# Patient Record
Sex: FEMALE | Race: WHITE | NOT HISPANIC OR LATINO | Employment: OTHER | URBAN - METROPOLITAN AREA
[De-identification: names, ages, dates, MRNs, and addresses within clinical notes are randomized per-mention and may not be internally consistent; named-entity substitution may affect disease eponyms.]

---

## 2017-01-23 ENCOUNTER — OFFICE VISIT (OUTPATIENT)
Dept: GASTROENTEROLOGY | Facility: CLINIC | Age: 57
End: 2017-01-23
Payer: COMMERCIAL

## 2017-01-23 VITALS
DIASTOLIC BLOOD PRESSURE: 80 MMHG | HEART RATE: 80 BPM | BODY MASS INDEX: 36.39 KG/M2 | WEIGHT: 226.44 LBS | HEIGHT: 66 IN | SYSTOLIC BLOOD PRESSURE: 128 MMHG

## 2017-01-23 DIAGNOSIS — R19.7 DIARRHEA IN ADULT PATIENT: Primary | ICD-10-CM

## 2017-01-23 PROCEDURE — 3079F DIAST BP 80-89 MM HG: CPT | Mod: S$GLB,,, | Performed by: INTERNAL MEDICINE

## 2017-01-23 PROCEDURE — 1159F MED LIST DOCD IN RCRD: CPT | Mod: S$GLB,,, | Performed by: INTERNAL MEDICINE

## 2017-01-23 PROCEDURE — 3074F SYST BP LT 130 MM HG: CPT | Mod: S$GLB,,, | Performed by: INTERNAL MEDICINE

## 2017-01-23 PROCEDURE — 99999 PR PBB SHADOW E&M-EST. PATIENT-LVL III: CPT | Mod: PBBFAC,,, | Performed by: INTERNAL MEDICINE

## 2017-01-23 PROCEDURE — 99203 OFFICE O/P NEW LOW 30 MIN: CPT | Mod: S$GLB,,, | Performed by: INTERNAL MEDICINE

## 2017-01-23 RX ORDER — METOPROLOL SUCCINATE 25 MG/1
TABLET, EXTENDED RELEASE ORAL
Refills: 0 | COMMUNITY
Start: 2017-01-04

## 2017-01-23 RX ORDER — LEVOTHYROXINE SODIUM 175 UG/1
175 TABLET ORAL DAILY
Refills: 1 | COMMUNITY
Start: 2016-11-18 | End: 2017-01-23

## 2017-01-23 RX ORDER — LEVOTHYROXINE SODIUM 150 UG/1
TABLET ORAL
Refills: 1 | COMMUNITY
Start: 2016-12-28 | End: 2023-07-17

## 2017-01-23 RX ORDER — ASPIRIN 81 MG/1
162 TABLET ORAL
COMMUNITY
End: 2020-05-28

## 2017-01-23 NOTE — PROGRESS NOTES
Subjective:       Patient ID: Karen Carpio is a 56 y.o. female.    Chief Complaint: Diarrhea    HPI Comments: The patient has a history of diarrhea workup in 2013, which was negative. Biopsies were negative for microscopic colitis. She was having some anxiety issues the last time her workup was done, and when it was negative it was felt to be anxiety which triggered her diarrhea. The most recent episode started some 6 months ago. There is nothing she can relate to the change in her symptoms. She has had no BRBPR or melena. There has been no true anorexia but says she is never truly hungry or thirsty. There is no un-intended weight loss. There is no abdominal pain; nausea or vomiting, and no aversion to the sight or smell of food.     The patient is on Metformin, but has been on this for years. Just the same I will ask her endocrinologist if we can do a trial of the patient off her metformin. Her stools are not always loose, but they are sometimes normal. Will also look to rule out overflow diarrhea.    Diarrhea    Pertinent negatives include no abdominal pain, arthralgias, chills, coughing, fever, headaches, myalgias or vomiting.     Review of Systems   Constitutional: Negative for activity change, appetite change, chills, diaphoresis, fatigue, fever and unexpected weight change.   HENT: Positive for ear pain and voice change. Negative for congestion, ear discharge, hearing loss, nosebleeds, postnasal drip and tinnitus.         Vertigo   Eyes: Negative for photophobia and visual disturbance.   Respiratory: Negative for apnea, cough, choking, chest tightness, shortness of breath and wheezing.    Cardiovascular: Negative for chest pain, palpitations and leg swelling.   Gastrointestinal: Positive for diarrhea. Negative for abdominal distention, abdominal pain, anal bleeding, blood in stool, constipation, nausea, rectal pain and vomiting.   Genitourinary: Negative for difficulty urinating, dyspareunia, dysuria, flank  pain, frequency, hematuria, menstrual problem, pelvic pain, urgency, vaginal bleeding and vaginal discharge.   Musculoskeletal: Negative for arthralgias, back pain, gait problem, joint swelling, myalgias and neck stiffness.   Skin: Negative for pallor and rash.   Neurological: Negative for dizziness, tremors, seizures, syncope, speech difficulty, weakness, numbness and headaches.   Hematological: Negative for adenopathy.   Psychiatric/Behavioral: Negative for agitation, confusion, hallucinations, sleep disturbance and suicidal ideas.       Objective:      Physical Exam   Constitutional: She is oriented to person, place, and time. She appears well-developed and well-nourished.   Obesity   HENT:   Head: Normocephalic and atraumatic.   Bilateral turbinate congestion   Eyes: Conjunctivae and EOM are normal. Pupils are equal, round, and reactive to light. Right eye exhibits no discharge. Left eye exhibits no discharge. No scleral icterus.   Neck: Normal range of motion. Neck supple. No JVD present. No thyromegaly present.   Cardiovascular: Normal rate, regular rhythm, normal heart sounds and intact distal pulses.  Exam reveals no gallop and no friction rub.    No murmur heard.  Pulmonary/Chest: Effort normal and breath sounds normal. No respiratory distress. She has no wheezes. She has no rales. She exhibits no tenderness.   Abdominal: Soft. Bowel sounds are normal. She exhibits no distension and no mass. There is no tenderness. There is no rebound and no guarding.   Musculoskeletal: Normal range of motion. She exhibits no edema.   Lymphadenopathy:     She has no cervical adenopathy.   Neurological: She is alert and oriented to person, place, and time. She has normal reflexes. She exhibits normal muscle tone. Coordination normal.   Skin: Skin is warm and dry. No rash noted. No erythema. No pallor.   Psychiatric: She has a normal mood and affect. Her behavior is normal. Judgment and thought content normal.   Vitals  reviewed.      Assessment:    Diarrhea   No diagnosis found.    Plan:       NATALI

## 2017-01-24 ENCOUNTER — HOSPITAL ENCOUNTER (OUTPATIENT)
Dept: RADIOLOGY | Facility: HOSPITAL | Age: 57
Discharge: HOME | End: 2017-01-24
Attending: INTERNAL MEDICINE
Payer: COMMERCIAL

## 2017-01-24 DIAGNOSIS — R19.7 DIARRHEA IN ADULT PATIENT: ICD-10-CM

## 2017-01-24 PROCEDURE — 74020 XR ABDOMEN FLAT AND ERECT: CPT | Mod: 26,,, | Performed by: RADIOLOGY

## 2017-01-24 PROCEDURE — 74020 XR ABDOMEN FLAT AND ERECT: CPT | Mod: TC

## 2017-01-27 DIAGNOSIS — K59.04 CHRONIC IDIOPATHIC CONSTIPATION: Primary | ICD-10-CM

## 2017-01-27 RX ORDER — SODIUM, POTASSIUM,MAG SULFATES 17.5-3.13G
SOLUTION, RECONSTITUTED, ORAL ORAL
Qty: 354 ML | Refills: 0 | Status: SHIPPED | OUTPATIENT
Start: 2017-01-27 | End: 2017-04-24

## 2017-04-10 ENCOUNTER — TELEPHONE (OUTPATIENT)
Dept: INTERNAL MEDICINE | Facility: CLINIC | Age: 57
End: 2017-04-10

## 2017-04-10 ENCOUNTER — PATIENT OUTREACH (OUTPATIENT)
Dept: ADMINISTRATIVE | Facility: HOSPITAL | Age: 57
End: 2017-04-10
Payer: COMMERCIAL

## 2017-04-10 DIAGNOSIS — E78.5 HYPERLIPIDEMIA, UNSPECIFIED HYPERLIPIDEMIA TYPE: ICD-10-CM

## 2017-04-10 DIAGNOSIS — Z11.59 NEED FOR HEPATITIS C SCREENING TEST: Primary | ICD-10-CM

## 2017-04-10 DIAGNOSIS — E88.819 INSULIN RESISTANCE: ICD-10-CM

## 2017-04-10 RX ORDER — ESCITALOPRAM OXALATE 10 MG/1
TABLET ORAL
Qty: 30 TABLET | Refills: 0 | Status: SHIPPED | OUTPATIENT
Start: 2017-04-10 | End: 2017-05-13 | Stop reason: SDUPTHER

## 2017-04-10 RX ORDER — BUPROPION HYDROCHLORIDE 300 MG/1
TABLET ORAL
Qty: 30 TABLET | Status: SHIPPED | OUTPATIENT
Start: 2017-04-10 | End: 2017-05-13 | Stop reason: SDUPTHER

## 2017-04-10 RX ORDER — FERROUS SULFATE 325(65) MG
TABLET ORAL
Qty: 30 TABLET | Refills: 0 | Status: SHIPPED | OUTPATIENT
Start: 2017-04-10 | End: 2017-05-13 | Stop reason: SDUPTHER

## 2017-04-10 NOTE — PROGRESS NOTES
PREVISIT CHART AUDIT LETTER SENT VIA PATIENT PORTAL  Hep C antibody, HgA1c and Lipid Panel ordered [facility]

## 2017-04-24 ENCOUNTER — LAB VISIT (OUTPATIENT)
Dept: LAB | Facility: HOSPITAL | Age: 57
End: 2017-04-24
Attending: PEDIATRICS
Payer: COMMERCIAL

## 2017-04-24 ENCOUNTER — OFFICE VISIT (OUTPATIENT)
Dept: INTERNAL MEDICINE | Facility: CLINIC | Age: 57
End: 2017-04-24
Payer: COMMERCIAL

## 2017-04-24 VITALS
TEMPERATURE: 97 F | WEIGHT: 223.13 LBS | OXYGEN SATURATION: 98 % | BODY MASS INDEX: 35.86 KG/M2 | DIASTOLIC BLOOD PRESSURE: 88 MMHG | HEIGHT: 66 IN | HEART RATE: 66 BPM | SYSTOLIC BLOOD PRESSURE: 116 MMHG

## 2017-04-24 DIAGNOSIS — E88.819 INSULIN RESISTANCE: ICD-10-CM

## 2017-04-24 DIAGNOSIS — F32.A DEPRESSION, UNSPECIFIED DEPRESSION TYPE: ICD-10-CM

## 2017-04-24 DIAGNOSIS — I48.0 PAROXYSMAL ATRIAL FIBRILLATION: ICD-10-CM

## 2017-04-24 DIAGNOSIS — E03.4 HYPOTHYROIDISM DUE TO ACQUIRED ATROPHY OF THYROID: ICD-10-CM

## 2017-04-24 DIAGNOSIS — Z00.00 WELL ADULT EXAM: ICD-10-CM

## 2017-04-24 DIAGNOSIS — K59.01 SLOW TRANSIT CONSTIPATION: ICD-10-CM

## 2017-04-24 DIAGNOSIS — E55.9 VITAMIN D DEFICIENCY: ICD-10-CM

## 2017-04-24 DIAGNOSIS — E78.5 HYPERLIPIDEMIA, UNSPECIFIED HYPERLIPIDEMIA TYPE: ICD-10-CM

## 2017-04-24 DIAGNOSIS — K59.04 CHRONIC IDIOPATHIC CONSTIPATION: ICD-10-CM

## 2017-04-24 DIAGNOSIS — D50.9 IRON DEFICIENCY ANEMIA, UNSPECIFIED IRON DEFICIENCY ANEMIA TYPE: ICD-10-CM

## 2017-04-24 DIAGNOSIS — Z11.59 NEED FOR HEPATITIS C SCREENING TEST: ICD-10-CM

## 2017-04-24 DIAGNOSIS — Z00.00 WELL ADULT EXAM: Primary | ICD-10-CM

## 2017-04-24 PROCEDURE — 82306 VITAMIN D 25 HYDROXY: CPT

## 2017-04-24 PROCEDURE — 36415 COLL VENOUS BLD VENIPUNCTURE: CPT | Mod: PO

## 2017-04-24 PROCEDURE — 86803 HEPATITIS C AB TEST: CPT

## 2017-04-24 PROCEDURE — 99396 PREV VISIT EST AGE 40-64: CPT | Mod: S$GLB,,, | Performed by: PEDIATRICS

## 2017-04-24 PROCEDURE — 83036 HEMOGLOBIN GLYCOSYLATED A1C: CPT

## 2017-04-24 PROCEDURE — 99999 PR PBB SHADOW E&M-EST. PATIENT-LVL III: CPT | Mod: PBBFAC,,, | Performed by: PEDIATRICS

## 2017-04-24 PROCEDURE — 80061 LIPID PANEL: CPT

## 2017-04-24 RX ORDER — SODIUM, POTASSIUM,MAG SULFATES 17.5-3.13G
SOLUTION, RECONSTITUTED, ORAL ORAL
Qty: 354 ML | Refills: 0 | Status: SHIPPED | OUTPATIENT
Start: 2017-04-24 | End: 2018-04-25

## 2017-04-24 NOTE — PROGRESS NOTES
Subjective:       Patient ID: Karen Carpio is a 57 y.o. female.    Chief Complaint: Follow-up    HPI Comments: Wellness exam.    GYN: outside GYN upcoming  Insulin resistance/hypothyroid/obesity :Followed by Dr Vidal FLYNN fib: followed by Dr Gresham  Chronic constipation with leakage: Has not been consistent with miralax  Depression: on wellbutrin/lexapro: has always chronic low level symptoms. Ho HI/SI. Has been traveling by self, is adapting to life as , is dating.   Hypercholesterol: High HDL  Anemia: stable.    Review of Systems   Constitutional: Negative for fever and unexpected weight change.   HENT: Negative for congestion and rhinorrhea.    Eyes: Negative for discharge and redness.   Respiratory: Negative for cough and wheezing.    Gastrointestinal: Positive for constipation and diarrhea. Negative for vomiting.   Endocrine: Negative for cold intolerance, heat intolerance, polydipsia, polyphagia and polyuria.   Genitourinary: Negative for decreased urine volume, difficulty urinating and menstrual problem.   Musculoskeletal: Negative for arthralgias and joint swelling.   Skin: Negative for rash and wound.   Neurological: Negative for syncope and headaches.   Psychiatric/Behavioral: Positive for dysphoric mood. Negative for behavioral problems, self-injury, sleep disturbance and suicidal ideas. The patient is not nervous/anxious.         Chronic low level.       Objective:      Physical Exam   Constitutional: She is oriented to person, place, and time. She appears well-developed and well-nourished. She is cooperative.   HENT:   Head: Normocephalic and atraumatic.   Eyes: Conjunctivae, EOM and lids are normal. Pupils are equal, round, and reactive to light.   Neck: Trachea normal and normal range of motion. Neck supple. No JVD present. Carotid bruit is not present. No Brudzinski's sign and no Kernig's sign noted. No thyroid mass and no thyromegaly present.   Cardiovascular: Normal rate, regular rhythm,  normal heart sounds and normal pulses.    No murmur heard.  Pulmonary/Chest: Effort normal and breath sounds normal. She has no wheezes. She has no rhonchi. She has no rales.   Abdominal: Soft. Normal appearance. There is no hepatosplenomegaly. There is no tenderness. There is no rebound and no CVA tenderness.   Musculoskeletal: She exhibits no edema.   Lymphadenopathy:     She has no cervical adenopathy.   Neurological: She is alert and oriented to person, place, and time. She has normal strength and normal reflexes. No cranial nerve deficit or sensory deficit. Coordination and gait normal.   Skin: Skin is warm. No abrasion and no rash noted.   Psychiatric: She has a normal mood and affect. Her speech is normal and behavior is normal. Judgment and thought content normal. Her mood appears not anxious. Cognition and memory are normal. She does not exhibit a depressed mood.       Assessment:       1. Well adult exam    2. Hypothyroidism due to acquired atrophy of thyroid    3. Insulin resistance    4. Iron deficiency anemia, unspecified iron deficiency anemia type    5. Hyperlipidemia, unspecified hyperlipidemia type    6. Depression, unspecified depression type    7. Vitamin D deficiency    8. Paroxysmal atrial fibrillation    9. Slow transit constipation        Plan:       Well adult exam    Hypothyroidism due to acquired atrophy of thyroid    Insulin resistance    Iron deficiency anemia, unspecified iron deficiency anemia type    Hyperlipidemia, unspecified hyperlipidemia type    Depression, unspecified depression type    Vitamin D deficiency    Paroxysmal atrial fibrillation    Slow transit constipation    Keep GYN and endo care. Daily regular miralax use discussed. Meds reviewed. 2000 IU D daily. Meds reviewed. HM issues discussed.

## 2017-04-24 NOTE — MR AVS SNAPSHOT
[facility]  [address] [place] [address]  Phone: [phone]  Fax: [phone]                  Karen Carpio   2017 2:00 PM   Office Visit    Description:  Female : 1960   Provider:  YUDITH Navarro Jr., MD   Department:  [facility]           Reason for Visit     Follow-up           Diagnoses this Visit        Comments    Well adult exam    -  Primary     Hypothyroidism due to acquired atrophy of thyroid         Insulin resistance         Iron deficiency anemia, unspecified iron deficiency anemia type         Hyperlipidemia, unspecified hyperlipidemia type         Depression, unspecified depression type         Vitamin D deficiency         Paroxysmal atrial fibrillation         Slow transit constipation                To Do List           Future Appointments        Provider Department Dept Phone    2017 4:30 PM LAB, SAME DAY [facility] [phone]    2018 8:00 AM YUDITH Navarro Jr., MD [facility] [phone]      Goals (5 Years of Data)     None      Follow-Up and Disposition     Return in about 1 year (around 2018).    Follow-up and Disposition History      [facility]     [facility] Care [facility] -  Assistance  Unless otherwise directed by your provider, please contact [facility], our nurse care line that is available for  assistance.     Registered nurses in the [facility] provide: appointment scheduling, clinical advisement, health education, and other advisory services.  Call: [phone] (toll free)               Medications           Message regarding Medications     Verify the changes and/or additions to your medication regime listed below are the same as discussed with your clinician today.  If any of these changes or additions are incorrect, please notify your healthcare provider.        STOP taking these medications     alprazolam (XANAX) 0.5 MG  "tablet Take 1 tablet (0.5 mg total) by mouth 3 (three) times daily as needed for Anxiety.    cholecalciferol, vitamin D3, (VITAMIN D3) 2,000 unit Tab Take by mouth once daily.    mupirocin (BACTROBAN) 2 % ointment Apply topically 3 (three) times daily.    scopolamine (TRANSDERM-SCOP) 1.5 mg (1 mg over 3 days) Place 1 patch (1.5 mg total) onto the skin every 72 hours. PRN motion sickness    sodium,potassium,mag sulfates (SUPREP BOWEL PREP KIT) 17.5-3.13-1.6 gram SolR As Directed    trazodone (DESYREL) 50 MG tablet take 1 tablet by mouth EVERY NIGHT if needed for insomnia           Verify that the below list of medications is an accurate representation of the medications you are currently taking.  If none reported, the list may be blank. If incorrect, please contact your healthcare provider. Carry this list with you in case of emergency.           Current Medications     aspirin (ECOTRIN) 81 MG EC tablet Take 81 mg by mouth.    buPROPion (WELLBUTRIN XL) 300 MG 24 hr tablet take 1 tablet by mouth once daily    escitalopram oxalate (LEXAPRO) 10 MG tablet take 1 tablet by mouth once daily    esomeprazole (NEXIUM) 20 MG capsule Take 20 mg by mouth once daily.     ferrous sulfate 325 mg (65 mg iron) Tab tablet take 1 tablet by mouth once daily    fluticasone (FLONASE) 50 mcg/actuation nasal spray 1 spray by Each Nare route 2 (two) times daily.    levothyroxine (SYNTHROID) 150 MCG tablet     metformin (GLUCOPHAGE) 500 MG tablet Take by mouth. 2 Tablet Oral Twice a day    metoprolol succinate (TOPROL-XL) 25 MG 24 hr tablet     OM-3/DHA/EPA/FISH OIL/VIT D3 (FISH OIL-VIT D3 ORAL) Take by mouth.           Clinical Reference Information           Your Vitals Were     BP Pulse Temp Height    116/88 (BP Location: Left arm, Patient Position: Sitting, BP Method: Manual) 66 96.9 °F (36.1 °C) (Tympanic) 5' 6" (1.676 m)    Weight SpO2 BMI    101.2 kg (223 lb 1.7 oz) 98% 36.01 kg/m2      Blood Pressure          Most Recent Value    BP  " 116/88      Allergies as of 4/24/2017     Benadryl [Diphenhydramine Hcl]    Simcor [Niacin-simvastatin]      Immunizations Administered on Date of Encounter - 4/24/2017     None      Orders Placed During Today's Visit     Future Labs/Procedures Expected by Expires    Hepatitis C antibody  4/24/2017 6/23/2018    Vitamin D  4/24/2017 6/23/2018      Language Assistance Services     ATTENTION: Language assistance services are available, free of charge. Please call [phone].      ATENCIÓN: Si habla juliokatya, tiene a ricks disposición servicios gratuitos de asistencia lingüística. Llame al [phone].     CHÚ Ý: N?u b?n nói Ti?ng Vi?t, có các d?ch v? h? tr? ngôn ng? mi?n phí dành cho b?n. G?i s? [phone].         [facility] complies with applicable Federal civil rights laws and does not discriminate on the basis of race, color, national origin, age, disability, or sex.

## 2017-04-25 LAB
25(OH)D3+25(OH)D2 SERPL-MCNC: 34 NG/ML
CHOLEST/HDLC SERPL: 3.3 {RATIO}
ESTIMATED AVG GLUCOSE: 108 MG/DL
HBA1C MFR BLD HPLC: 5.4 %
HDL/CHOLESTEROL RATIO: 30.7 %
HDLC SERPL-MCNC: 244 MG/DL
HDLC SERPL-MCNC: 75 MG/DL
LDLC SERPL CALC-MCNC: 152.6 MG/DL
NONHDLC SERPL-MCNC: 169 MG/DL
TRIGL SERPL-MCNC: 82 MG/DL

## 2017-04-26 LAB
HCV AB SERPL QL IA: NEGATIVE
HCV AB SERPL QL IA: NEGATIVE

## 2017-05-14 RX ORDER — ESCITALOPRAM OXALATE 10 MG/1
TABLET ORAL
Qty: 30 TABLET | Refills: 11 | Status: SHIPPED | OUTPATIENT
Start: 2017-05-14 | End: 2020-05-28

## 2017-05-14 RX ORDER — FERROUS SULFATE 325(65) MG
TABLET ORAL
Qty: 30 TABLET | Refills: 11 | Status: SHIPPED | OUTPATIENT
Start: 2017-05-14 | End: 2018-05-09 | Stop reason: SDUPTHER

## 2017-05-14 RX ORDER — BUPROPION HYDROCHLORIDE 300 MG/1
TABLET ORAL
Qty: 30 TABLET | Refills: 11 | Status: SHIPPED | OUTPATIENT
Start: 2017-05-14 | End: 2020-05-28

## 2018-04-25 ENCOUNTER — OFFICE VISIT (OUTPATIENT)
Dept: INTERNAL MEDICINE | Facility: CLINIC | Age: 58
End: 2018-04-25
Payer: COMMERCIAL

## 2018-04-25 VITALS
BODY MASS INDEX: 38.44 KG/M2 | OXYGEN SATURATION: 96 % | HEIGHT: 66 IN | WEIGHT: 239.19 LBS | SYSTOLIC BLOOD PRESSURE: 130 MMHG | DIASTOLIC BLOOD PRESSURE: 80 MMHG | TEMPERATURE: 98 F | HEART RATE: 66 BPM

## 2018-04-25 DIAGNOSIS — Z00.00 WELL ADULT EXAM: Primary | ICD-10-CM

## 2018-04-25 DIAGNOSIS — E78.5 HYPERLIPIDEMIA, UNSPECIFIED HYPERLIPIDEMIA TYPE: ICD-10-CM

## 2018-04-25 DIAGNOSIS — E55.9 VITAMIN D DEFICIENCY: ICD-10-CM

## 2018-04-25 DIAGNOSIS — E03.4 HYPOTHYROIDISM DUE TO ACQUIRED ATROPHY OF THYROID: ICD-10-CM

## 2018-04-25 DIAGNOSIS — K59.01 SLOW TRANSIT CONSTIPATION: ICD-10-CM

## 2018-04-25 DIAGNOSIS — D50.9 IRON DEFICIENCY ANEMIA, UNSPECIFIED IRON DEFICIENCY ANEMIA TYPE: ICD-10-CM

## 2018-04-25 DIAGNOSIS — F32.A DEPRESSION, UNSPECIFIED DEPRESSION TYPE: ICD-10-CM

## 2018-04-25 DIAGNOSIS — E88.819 INSULIN RESISTANCE: ICD-10-CM

## 2018-04-25 DIAGNOSIS — I48.0 PAROXYSMAL ATRIAL FIBRILLATION: ICD-10-CM

## 2018-04-25 PROCEDURE — 99999 PR PBB SHADOW E&M-EST. PATIENT-LVL III: CPT | Mod: PBBFAC,,, | Performed by: PEDIATRICS

## 2018-04-25 PROCEDURE — 99396 PREV VISIT EST AGE 40-64: CPT | Mod: S$GLB,,, | Performed by: PEDIATRICS

## 2018-04-25 RX ORDER — CETIRIZINE HYDROCHLORIDE 10 MG/1
10 TABLET ORAL DAILY
COMMUNITY

## 2018-04-25 RX ORDER — CHOLECALCIFEROL (VITAMIN D3) 25 MCG
2000 TABLET ORAL DAILY
COMMUNITY

## 2018-04-25 RX ORDER — METFORMIN HYDROCHLORIDE 500 MG/1
200 TABLET, EXTENDED RELEASE ORAL
COMMUNITY
End: 2020-05-28

## 2018-05-09 RX ORDER — FERROUS SULFATE 325(65) MG
TABLET ORAL
Qty: 30 TABLET | Refills: 11 | Status: SHIPPED | OUTPATIENT
Start: 2018-05-09 | End: 2019-05-11 | Stop reason: SDUPTHER

## 2018-07-10 ENCOUNTER — OFFICE VISIT (OUTPATIENT)
Dept: OPHTHALMOLOGY | Facility: CLINIC | Age: 58
End: 2018-07-10

## 2018-07-10 DIAGNOSIS — H52.203 MYOPIC ASTIGMATISM OF BOTH EYES: ICD-10-CM

## 2018-07-10 DIAGNOSIS — Z01.00 VISIT FOR EYE AND VISION EXAM: Primary | ICD-10-CM

## 2018-07-10 DIAGNOSIS — Z13.5 GLAUCOMA SCREENING: ICD-10-CM

## 2018-07-10 DIAGNOSIS — H52.4 PRESBYOPIA: ICD-10-CM

## 2018-07-10 DIAGNOSIS — H52.13 MYOPIC ASTIGMATISM OF BOTH EYES: ICD-10-CM

## 2018-07-10 PROCEDURE — 92014 COMPRE OPH EXAM EST PT 1/>: CPT | Mod: S$GLB,,, | Performed by: OPTOMETRIST

## 2018-07-10 PROCEDURE — 92015 DETERMINE REFRACTIVE STATE: CPT | Mod: S$GLB,,, | Performed by: OPTOMETRIST

## 2018-07-10 NOTE — PROGRESS NOTES
HPI     Last [facility] exam 09/30/2015  Screening for glaucoma  RE  Decreased near vision mostly OS     Last edited by Chente Lopez MA on 7/10/2018 10:56 AM. (History)            Assessment /Plan     For exam results, see Encounter Report.    Visit for eye and vision exam    Glaucoma screening    Myopic astigmatism of both eyes    Presbyopia      OH OK OU.  Spec Rx given.  RTC one year or prn.

## 2019-01-28 ENCOUNTER — TELEPHONE (OUTPATIENT)
Dept: OPHTHALMOLOGY | Facility: CLINIC | Age: 59
End: 2019-01-28

## 2019-04-18 ENCOUNTER — TELEPHONE (OUTPATIENT)
Dept: INTERNAL MEDICINE | Facility: CLINIC | Age: 59
End: 2019-04-18

## 2019-04-18 NOTE — TELEPHONE ENCOUNTER
Called pt to r/s 04/25/19 appt w/ Dr. Navarro d/t provider out of clinic that afternoon, no answer, left pt voicemail requesting call back to r/s appt. Sent pt [place] message as well.

## 2019-05-07 ENCOUNTER — OFFICE VISIT (OUTPATIENT)
Dept: INTERNAL MEDICINE | Facility: CLINIC | Age: 59
End: 2019-05-07
Payer: COMMERCIAL

## 2019-05-07 VITALS
BODY MASS INDEX: 37.67 KG/M2 | HEART RATE: 68 BPM | OXYGEN SATURATION: 99 % | DIASTOLIC BLOOD PRESSURE: 88 MMHG | SYSTOLIC BLOOD PRESSURE: 118 MMHG | HEIGHT: 66 IN | WEIGHT: 234.38 LBS | TEMPERATURE: 97 F

## 2019-05-07 DIAGNOSIS — E78.5 HYPERLIPIDEMIA, UNSPECIFIED HYPERLIPIDEMIA TYPE: ICD-10-CM

## 2019-05-07 DIAGNOSIS — E55.9 VITAMIN D DEFICIENCY: ICD-10-CM

## 2019-05-07 DIAGNOSIS — D50.9 IRON DEFICIENCY ANEMIA, UNSPECIFIED IRON DEFICIENCY ANEMIA TYPE: ICD-10-CM

## 2019-05-07 DIAGNOSIS — F33.1 MODERATE EPISODE OF RECURRENT MAJOR DEPRESSIVE DISORDER: ICD-10-CM

## 2019-05-07 DIAGNOSIS — Z00.00 WELL ADULT EXAM: Primary | ICD-10-CM

## 2019-05-07 DIAGNOSIS — I48.0 PAROXYSMAL ATRIAL FIBRILLATION: ICD-10-CM

## 2019-05-07 DIAGNOSIS — K59.01 SLOW TRANSIT CONSTIPATION: ICD-10-CM

## 2019-05-07 DIAGNOSIS — E88.819 INSULIN RESISTANCE: ICD-10-CM

## 2019-05-07 DIAGNOSIS — E03.4 HYPOTHYROIDISM DUE TO ACQUIRED ATROPHY OF THYROID: ICD-10-CM

## 2019-05-07 PROCEDURE — 99999 PR PBB SHADOW E&M-EST. PATIENT-LVL III: ICD-10-PCS | Mod: PBBFAC,,, | Performed by: PEDIATRICS

## 2019-05-07 PROCEDURE — 99396 PR PREVENTIVE VISIT,EST,40-64: ICD-10-PCS | Mod: S$GLB,,, | Performed by: PEDIATRICS

## 2019-05-07 PROCEDURE — 99999 PR PBB SHADOW E&M-EST. PATIENT-LVL III: CPT | Mod: PBBFAC,,, | Performed by: PEDIATRICS

## 2019-05-07 PROCEDURE — 99396 PREV VISIT EST AGE 40-64: CPT | Mod: S$GLB,,, | Performed by: PEDIATRICS

## 2019-05-07 RX ORDER — ARIPIPRAZOLE 2 MG/1
2.5 TABLET ORAL DAILY
COMMUNITY
End: 2022-07-11

## 2019-05-07 RX ORDER — ATORVASTATIN CALCIUM 20 MG/1
TABLET, FILM COATED ORAL
COMMUNITY
Start: 2018-02-02

## 2019-05-07 NOTE — PROGRESS NOTES
Subjective:       Patient ID: Karen Carpio is a 58 y.o. female.     Chief Complaint: Follow-up     HPI Comments: Wellness exam.     GYN: outside GYN upcoming by Dr Lopez June 22, 2018 with mammogram  Insulin resistance/hypothyroid/obesity :Followed by Dr Drake [facility] everywhere note reviewed.  P A fib: followed by Dr Gresham, he is happy with lipids, does well with Afib, has occasional brief runs.  Chronic constipation/diarrhea resolved with switch to XR metformin  Depression: Dr gopal ribeiro, abilify just started, no HI/SI.   Hypercholesterol: High HDL  Anemia: stable.     Review of Systems   Constitutional: Negative for fever and unexpected weight change.   HENT: Negative for congestion and rhinorrhea.    Eyes: Negative for discharge and redness.   Respiratory: Negative for cough and wheezing.    Gastrointestinal: Negative for further constipation or diarrhea with adjustment to XR metformin. Negative for vomiting.   Endocrine: Negative for cold intolerance, heat intolerance, polydipsia, polyphagia and polyuria.   Genitourinary: Negative for decreased urine volume, difficulty urinating and menstrual problem.   Musculoskeletal: Negative for arthralgias and joint swelling.   Skin: Negative for rash and wound.   Neurological: Negative for syncope and headaches.   Psychiatric/Behavioral: Positive for dysphoric mood. Negative for behavioral problems, self-injury, sleep disturbance and suicidal ideas. The patient is not nervous/anxious.         Chronic low level.      Objective:   Physical Exam   Constitutional: She is oriented to person, place, and time. She appears well-developed and well-nourished. She is cooperative.   HENT:   Head: Normocephalic and atraumatic.   Eyes: Conjunctivae, EOM and lids are normal. Pupils are equal, round, and reactive to light.   Neck: Trachea normal and normal range of motion. Neck supple. No JVD present. Carotid bruit is not present. No Brudzinski's sign and no Kernig's sign noted.  No thyroid mass and no thyromegaly present.   Cardiovascular: Normal rate, regular rhythm, normal heart sounds and normal pulses.    No murmur heard.  Pulmonary/Chest: Effort normal and breath sounds normal. She has no wheezes. She has no rhonchi. She has no rales.   Abdominal: Soft. Normal appearance. There is no hepatosplenomegaly. There is no tenderness. There is no rebound and no CVA tenderness.   Musculoskeletal: She exhibits no edema.   Lymphadenopathy:     She has no cervical adenopathy.   Neurological: She is alert and oriented to person, place, and time. She has normal strength and normal reflexes. No cranial nerve deficit or sensory deficit. Coordination and gait normal.   Skin: Skin is warm. No abrasion and no rash noted.   Psychiatric: She has a normal mood and affect. Her speech is normal and behavior is normal. Judgment and thought content normal. Her mood appears not anxious. Cognition and memory are normal. She does not exhibit a depressed mood.      Assessment:      1. Well adult exam    2. Hypothyroidism due to acquired atrophy of thyroid    3. Insulin resistance    4. Iron deficiency anemia, unspecified iron deficiency anemia type    5. Hyperlipidemia, unspecified hyperlipidemia type    6. Depression, unspecified depression type    7. Vitamin D deficiency    8. Paroxysmal atrial fibrillation             Plan:    Keep GYN, cards, psychiatry, and endo care. Daily regular miralax use discussed. Meds reviewed. Continue D 2000 IU D daily. Meds reviewed. HM issues discussed. Shingrix when available.

## 2019-05-13 RX ORDER — FERROUS SULFATE 325(65) MG
TABLET ORAL
Qty: 30 TABLET | Refills: 11 | Status: SHIPPED | OUTPATIENT
Start: 2019-05-13 | End: 2020-05-27

## 2019-07-02 ENCOUNTER — OFFICE VISIT (OUTPATIENT)
Dept: INTERNAL MEDICINE | Facility: CLINIC | Age: 59
End: 2019-07-02
Payer: COMMERCIAL

## 2019-07-02 ENCOUNTER — TELEPHONE (OUTPATIENT)
Dept: INTERNAL MEDICINE | Facility: CLINIC | Age: 59
End: 2019-07-02

## 2019-07-02 VITALS
HEART RATE: 59 BPM | BODY MASS INDEX: 36.88 KG/M2 | OXYGEN SATURATION: 95 % | TEMPERATURE: 98 F | SYSTOLIC BLOOD PRESSURE: 134 MMHG | HEIGHT: 66 IN | DIASTOLIC BLOOD PRESSURE: 80 MMHG | WEIGHT: 229.5 LBS

## 2019-07-02 DIAGNOSIS — R05.9 COUGH: ICD-10-CM

## 2019-07-02 DIAGNOSIS — J01.90 ACUTE SINUSITIS, RECURRENCE NOT SPECIFIED, UNSPECIFIED LOCATION: Primary | ICD-10-CM

## 2019-07-02 PROCEDURE — 3008F BODY MASS INDEX DOCD: CPT | Mod: CPTII,S$GLB,, | Performed by: NURSE PRACTITIONER

## 2019-07-02 PROCEDURE — 3008F PR BODY MASS INDEX (BMI) DOCUMENTED: ICD-10-PCS | Mod: CPTII,S$GLB,, | Performed by: NURSE PRACTITIONER

## 2019-07-02 PROCEDURE — 99999 PR PBB SHADOW E&M-EST. PATIENT-LVL V: CPT | Mod: PBBFAC,,, | Performed by: NURSE PRACTITIONER

## 2019-07-02 PROCEDURE — 99214 PR OFFICE/OUTPT VISIT, EST, LEVL IV, 30-39 MIN: ICD-10-PCS | Mod: S$GLB,,, | Performed by: NURSE PRACTITIONER

## 2019-07-02 PROCEDURE — 99999 PR PBB SHADOW E&M-EST. PATIENT-LVL V: ICD-10-PCS | Mod: PBBFAC,,, | Performed by: NURSE PRACTITIONER

## 2019-07-02 PROCEDURE — 99214 OFFICE O/P EST MOD 30 MIN: CPT | Mod: S$GLB,,, | Performed by: NURSE PRACTITIONER

## 2019-07-02 RX ORDER — PREDNISONE 20 MG/1
TABLET ORAL
Refills: 0 | COMMUNITY
Start: 2019-06-28 | End: 2019-07-02 | Stop reason: ALTCHOICE

## 2019-07-02 RX ORDER — DOXYCYCLINE 100 MG/1
100 CAPSULE ORAL EVERY 12 HOURS
Qty: 20 CAPSULE | Refills: 0 | Status: SHIPPED | OUTPATIENT
Start: 2019-07-02 | End: 2019-07-12

## 2019-07-02 RX ORDER — BENZONATATE 100 MG/1
CAPSULE ORAL
Refills: 0 | COMMUNITY
Start: 2019-06-28 | End: 2020-05-28

## 2019-07-02 NOTE — TELEPHONE ENCOUNTER
----- Message from Sheryl Mckeon sent at 7/2/2019 10:11 AM CDT -----  Contact: pt  .Type:  Needs Medical Advice    Who Called:  pt  Symptoms (please be specific):  Mucus grayish    How long has patient had these symptoms:   1week  Pharmacy name and phone #:      [id number] [place] [facility] [id number]  [address] [place] [address]  Phone: [phone] Fax: [phone]      Would the patient rather a call back or a response via [facility]? Call back   Best Call Back Number:  [phone] (home)   Additional Information:  Pt went to [facility] after hours and an antibiotic was not given

## 2019-07-02 NOTE — TELEPHONE ENCOUNTER
Returned call to pt regarding having grayish mucus. Advised pt that she will need to be seen. Appt scheduled for 11:40 today with Juany Olivia. Pt verbalized understanding of appt date and time.

## 2019-07-02 NOTE — PROGRESS NOTES
CHIEF COMPLAINT/REASON FOR VISIT: nasal congestion, post nasal drip, sore throat, facial pressure    HISTORY OF PRESENT ILLNESS:    Karen Carpio.  59 y.o.  female complains of a sinus issue with nasal congestion, post nasal drip, sore throat, facial pressure, bilateral ear discomfort  and productive cough onset 1 week ago. Patient admits tried OTC medications with little relief.     PAST MEDICAL HISTORY:   Past Medical History:   Diagnosis Date    Acid reflux     Anemia, iron deficiency     Atrial fibrillation     Depression     GERD (gastroesophageal reflux disease)     Hyperlipidemia     Insulin resistance     Dr Drake    Insulin resistance     Pneumonia     Sleep apnea, obstructive     Dr León    Thyroid disease     Dr Drake         PAST SURGICAL HISTORY:.  Past Surgical History:   Procedure Laterality Date    COLONOSCOPY      COLONOSCOPY N/A 12/11/2013    [facility]    EGD (ESOPHAGOGASTRODUODENOSCOPY) N/A 12/11/2013    Per[facility] by [facility] at [facility] END[facility]    MANDIBLE SURGERY      TONSILLECTOMY, ADENOIDECTOMY           SOCIAL HISTORY:.  Social History     Socioeconomic History    Marital status:      Spouse name: Not on file    Number of children: Not on file    Years of education: Not on file    Highest education level: Not on file   Occupational History    Not on file   Social Needs    Financial resource strain: Not on file    Food insecurity:     Worry: Not on file     Inability: Not on file    Transportation needs:     Medical: Not on file     Non-medical: Not on file   Tobacco Use    Smoking status: Never Smoker    Smokeless tobacco: Never Used   Substance and Sexual Activity    Alcohol use: Yes     Alcohol/week: 1.1 oz     Types: 1 Glasses of wine, 1 Standard drinks or equivalent per week    Drug use: No    Sexual activity: Not Currently     Partners: Male     Birth control/protection: None   Lifestyle     "Physical activity:     Days per week: Not on file     Minutes per session: Not on file    Stress: Not on file   Relationships    Social connections:     Talks on phone: Not on file     Gets together: Not on file     Attends Faith service: Not on file     Active member of club or organization: Not on file     Attends meetings of clubs or organizations: Not on file     Relationship status: Not on file   Other Topics Concern    Not on file   Social History Narrative    Not on file       ROS:  GENERAL: Reports no fever, chills.  SKIN: No rashes, itching or changes in color or texture of skin.  HEENT: Reports sinus pressure, nasal congestion, postnasal drip, sore throat, ear discomfort, rhinorrhea.  CHEST: Reports cough and sputum production.  Denies shortness of breath and wheezing.  CARDIOVASCULAR: Denies chest pain, PND, orthopnea or reduced exercise tolerance.  ABDOMEN: Appetite fine. No weight loss. .  MUSCULOSKELETAL: No joint stiffness, pain or swelling. Denies back pain.  NEUROLOGIC: Report headaches. No history of seizures, paralysis, alteration of gait or coordination.  PSYCHIATRIC: Denies mood swings, depression or suicidal thoughts.    /80 (BP Location: Right arm, Patient Position: Sitting, BP Method: Large (Manual))   Pulse (!) 59   Temp 98.1 °F (36.7 °C) (Tympanic)   Ht 5' 6" (1.676 m)   Wt 104.1 kg (229 lb 8 oz)   SpO2 95%   BMI 37.04 kg/m² .    PE:   APPEARANCE: Well nourished, well developed, in mild distress.   SKIN: Normal skin turgor, no lesions.  HEENT: Turbinates red and enlarge, sinus tenderness on palpation, erythematous pharynx, TMs poor light reflex bilaterally.  CHEST: Lungs clear to auscultation. no wheezing  CARDIOVASCULAR: Regular rate and rhythm.      PLAN:   Advise Hydrate and rest.    Practice good handwashing.  Mucinex for cough and chest congestion.  Tylenol or Ibuprofen for fever, headache and body aches..  See PCP or go to ER if symptoms worsen or fail to improve " with treatment.        DIAGNOSIS:  Acute sinusitis, recurrence not specified, unspecified location  Comments:  Continue Ala-hist.    Orders:  -     doxycycline (VIBRAMYCIN) 100 MG Cap; Take 1 capsule (100 mg total) by mouth every 12 (twelve) hours. for 10 days  Dispense: 20 capsule; Refill: 0    Cough  Comments:  Continue Tessalon        Instructed to take all medications as ordered.  Informed if no improvement or symptoms worsens to follow up with primary care physician.  Informed to hydrate and rest.  Printed and review after visit summary with patient.

## 2020-05-06 ENCOUNTER — TELEPHONE (OUTPATIENT)
Dept: INTERNAL MEDICINE | Facility: CLINIC | Age: 60
End: 2020-05-06

## 2020-05-06 NOTE — TELEPHONE ENCOUNTER
----- Message from Alexsandra De La Cruz sent at 5/6/2020  1:50 PM CDT -----  Pt is requesting a call back regarding schedule an in office visit. Please call pt back at [phone]

## 2020-05-07 ENCOUNTER — OFFICE VISIT (OUTPATIENT)
Dept: INTERNAL MEDICINE | Facility: CLINIC | Age: 60
End: 2020-05-07
Payer: COMMERCIAL

## 2020-05-07 VITALS
BODY MASS INDEX: 38.44 KG/M2 | WEIGHT: 239.19 LBS | HEART RATE: 74 BPM | HEIGHT: 66 IN | OXYGEN SATURATION: 96 % | DIASTOLIC BLOOD PRESSURE: 78 MMHG | TEMPERATURE: 98 F | SYSTOLIC BLOOD PRESSURE: 112 MMHG

## 2020-05-07 DIAGNOSIS — S80.12XA TRAUMATIC HEMATOMA OF LEFT LOWER LEG, INITIAL ENCOUNTER: Primary | ICD-10-CM

## 2020-05-07 PROCEDURE — 99999 PR PBB SHADOW E&M-EST. PATIENT-LVL III: CPT | Mod: PBBFAC,,, | Performed by: FAMILY MEDICINE

## 2020-05-07 PROCEDURE — 99213 OFFICE O/P EST LOW 20 MIN: CPT | Mod: S$GLB,,, | Performed by: FAMILY MEDICINE

## 2020-05-07 PROCEDURE — 99213 PR OFFICE/OUTPT VISIT, EST, LEVL III, 20-29 MIN: ICD-10-PCS | Mod: S$GLB,,, | Performed by: FAMILY MEDICINE

## 2020-05-07 PROCEDURE — 99999 PR PBB SHADOW E&M-EST. PATIENT-LVL III: ICD-10-PCS | Mod: PBBFAC,,, | Performed by: FAMILY MEDICINE

## 2020-05-07 PROCEDURE — 3008F BODY MASS INDEX DOCD: CPT | Mod: CPTII,S$GLB,, | Performed by: FAMILY MEDICINE

## 2020-05-07 PROCEDURE — 3008F PR BODY MASS INDEX (BMI) DOCUMENTED: ICD-10-PCS | Mod: CPTII,S$GLB,, | Performed by: FAMILY MEDICINE
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
## 2020-05-27 RX ORDER — FERROUS SULFATE 325(65) MG
TABLET ORAL
Qty: 30 TABLET | Refills: 11 | Status: SHIPPED | OUTPATIENT
Start: 2020-05-27 | End: 2021-06-21

## 2020-05-27 NOTE — TELEPHONE ENCOUNTER
Advised pt refill approved and per Dr. Navarro to sched Annual appt. Pt confirmed f/u sched w/ Dr. Navarro tomorrow 05/28/20.

## 2020-05-28 ENCOUNTER — OFFICE VISIT (OUTPATIENT)
Dept: INTERNAL MEDICINE | Facility: CLINIC | Age: 60
End: 2020-05-28
Payer: COMMERCIAL

## 2020-05-28 VITALS
HEART RATE: 61 BPM | DIASTOLIC BLOOD PRESSURE: 78 MMHG | SYSTOLIC BLOOD PRESSURE: 108 MMHG | RESPIRATION RATE: 16 BRPM | TEMPERATURE: 99 F | BODY MASS INDEX: 37.31 KG/M2 | WEIGHT: 232.13 LBS | HEIGHT: 66 IN | OXYGEN SATURATION: 99 %

## 2020-05-28 DIAGNOSIS — K21.9 GASTROESOPHAGEAL REFLUX DISEASE WITHOUT ESOPHAGITIS: ICD-10-CM

## 2020-05-28 DIAGNOSIS — Z98.84 S/P BARIATRIC SURGERY: ICD-10-CM

## 2020-05-28 PROCEDURE — 99396 PR PREVENTIVE VISIT,EST,40-64: ICD-10-PCS | Mod: S$GLB,,, | Performed by: PEDIATRICS

## 2020-05-28 PROCEDURE — 99999 PR PBB SHADOW E&M-EST. PATIENT-LVL III: ICD-10-PCS | Mod: PBBFAC,,, | Performed by: PEDIATRICS

## 2020-05-28 PROCEDURE — 99396 PREV VISIT EST AGE 40-64: CPT | Mod: S$GLB,,, | Performed by: PEDIATRICS

## 2020-05-28 PROCEDURE — 99999 PR PBB SHADOW E&M-EST. PATIENT-LVL III: CPT | Mod: PBBFAC,,, | Performed by: PEDIATRICS

## 2020-05-28 RX ORDER — BUPROPION HYDROCHLORIDE 300 MG/1
300 TABLET ORAL DAILY
Qty: 30 TABLET | Refills: 11
Start: 2020-05-28

## 2020-05-28 RX ORDER — ESCITALOPRAM OXALATE 10 MG/1
15 TABLET ORAL DAILY
Start: 2020-05-28 | End: 2023-07-17

## 2020-05-28 NOTE — PROGRESS NOTES
Subjective:       Patient ID: Karen Carpio is a 60 y.o. female.     Chief Complaint: Follow-up     HPI Comments: Wellness exam.     GYN: outside GYN upcoming by Dr Lopez June 27 2020 with mammogram.  Insulin resistance/hypothyroid/obesity :Followed by Dr Drake [facility] everywhere note reviewed. Had laparoscopic vertical sleeve gastrectomy 2/20 by Dr Corwin Rashid . Insulin resistance reversed by surgery, off metformin.  P A fib: followed by Dr Gresham, on lipitor, does well with Afib, has occasional brief runs.  Chronic constipation/diarrhea resolved, uses miralax qod  Depression: Dr gopal ribeiro, on wellbutrin and abilify, no HI/SI.   Hypercholesterol: High HDL on lipitor  Anemia: stable.     Review of Systems   Constitutional: Negative for fever and unexpected weight change.   HENT: Negative for congestion and rhinorrhea.    Eyes: Negative for discharge and redness.   Respiratory: Negative for cough and wheezing.    Gastrointestinal: Negative for further constipation or diarrhea with adjustment to XR metformin. Negative for vomiting.   Endocrine: Negative for cold intolerance, heat intolerance, polydipsia, polyphagia and polyuria.   Genitourinary: Negative for decreased urine volume, difficulty urinating and menstrual problem.   Musculoskeletal: Negative for arthralgias and joint swelling.   Skin: Negative for rash and wound.   Neurological: Negative for syncope and headaches.   Psychiatric/Behavioral: Positive for dysphoric mood. Negative for behavioral problems, self-injury, sleep disturbance and suicidal ideas. The patient is not nervous/anxious.         Chronic low level.      Objective:   Physical Exam   Constitutional: She is oriented to person, place, and time. She appears well-developed and well-nourished. She is cooperative.   HENT:   Head: Normocephalic and atraumatic.   Eyes: Conjunctivae, EOM and lids are normal. Pupils are equal, round, and reactive to light.   Neck: Trachea normal and normal range  of motion. Neck supple. No JVD present. Carotid bruit is not present. No Brudzinski's sign and no Kernig's sign noted. No thyroid mass and no thyromegaly present.   Cardiovascular: Normal rate, regular rhythm, normal heart sounds and normal pulses.    No murmur heard.  Pulmonary/Chest: Effort normal and breath sounds normal. She has no wheezes. She has no rhonchi. She has no rales.   Abdominal: Soft. Normal appearance. There is no hepatosplenomegaly. There is no tenderness. There is no rebound and no CVA tenderness.   Musculoskeletal: She exhibits no edema.   Lymphadenopathy:     She has no cervical adenopathy.   Neurological: She is alert and oriented to person, place, and time. She has normal strength and normal reflexes. No cranial nerve deficit or sensory deficit. Coordination and gait normal.   Skin: Skin is warm. No abrasion and no rash noted.   Psychiatric: She has a normal mood and affect. Her speech is normal and behavior is normal. Judgment and thought content normal. Her mood appears not anxious. Cognition and memory are normal. She does not exhibit a depressed mood.      Assessment:      1. Well adult exam    2. Hypothyroidism due to acquired atrophy of thyroid    3. Insulin resistance: reversed with bariatric surgery    4. Iron deficiency anemia, unspecified iron deficiency anemia type    5. Hyperlipidemia, unspecified hyperlipidemia type    6. Depression, unspecified depression type    7. Vitamin D deficiency    8. Paroxysmal atrial fibrillation    9. s/p laparoscopic vertical sleeve gastrectomy.        Plan:    Keep GYN, cards, psychiatry, and [facility] care.  Meds reviewed. HM issues discussed. Shingrix when available. F/U yearly

## 2020-07-29 ENCOUNTER — PATIENT OUTREACH (OUTPATIENT)
Dept: ADMINISTRATIVE | Facility: HOSPITAL | Age: 60
End: 2020-07-29

## 2020-07-29 NOTE — PROGRESS NOTES
Called patient with reminder to schedule pap smear. Patient's last pap smear and mammogram performed at [facility]. Patient is agreeable to sign release of information, e-mail sent today.

## 2020-07-31 DIAGNOSIS — Z12.39 BREAST CANCER SCREENING: ICD-10-CM

## 2020-08-03 ENCOUNTER — PATIENT OUTREACH (OUTPATIENT)
Dept: ADMINISTRATIVE | Facility: HOSPITAL | Age: 60
End: 2020-08-03

## 2020-08-03 NOTE — LETTER
August 3, 2020      We are seeing Karen Carpio, 1960, at [facility]. DARIUS Navarro Jr, MD is their primary care physician. To help with our [place] maintenance records could you please send the following:     Mammogram   Please fax to [facility] at [phone], attention Sadie Lugo.      Thank-you in advance for your assistance. If you have any questions or concerns please contact me at [phone]  .     Sadie Lugo MA  Care Coordination Department  [facility]   [phone]

## 2020-08-03 NOTE — PROGRESS NOTES
Working mammogram report; I called pt to schedule overdue mammogram, pt states she had a mammogram in  at [facility]. I have requested [facility]ogram from [facility].

## 2020-08-04 NOTE — PROGRESS NOTES
Received documentation from [facility] for patient's mammogram. There are no records on this patient at this facility.

## 2020-10-06 ENCOUNTER — PATIENT MESSAGE (OUTPATIENT)
Dept: ADMINISTRATIVE | Facility: HOSPITAL | Age: 60
End: 2020-10-06

## 2020-12-21 ENCOUNTER — PATIENT OUTREACH (OUTPATIENT)
Dept: ADMINISTRATIVE | Facility: HOSPITAL | Age: 60
End: 2020-12-21

## 2021-01-11 ENCOUNTER — PATIENT OUTREACH (OUTPATIENT)
Dept: ADMINISTRATIVE | Facility: HOSPITAL | Age: 61
End: 2021-01-11

## 2021-04-28 ENCOUNTER — PATIENT MESSAGE (OUTPATIENT)
Dept: RESEARCH | Facility: HOSPITAL | Age: 61
End: 2021-04-28

## 2021-06-11 ENCOUNTER — PATIENT OUTREACH (OUTPATIENT)
Dept: ADMINISTRATIVE | Facility: HOSPITAL | Age: 61
End: 2021-06-11

## 2021-06-21 RX ORDER — FERROUS SULFATE TAB 325 MG (65 MG ELEMENTAL FE) 325 (65 FE) MG
TAB ORAL
Qty: 30 TABLET | Refills: 11 | Status: SHIPPED | OUTPATIENT
Start: 2021-06-21 | End: 2022-06-28

## 2022-02-03 ENCOUNTER — TELEPHONE (OUTPATIENT)
Dept: INTERNAL MEDICINE | Facility: CLINIC | Age: 62
End: 2022-02-03
Payer: COMMERCIAL

## 2022-02-03 NOTE — TELEPHONE ENCOUNTER
Spoke with pt about virtual appt. Pt is scheduled for tomorrow with  Mela DUMONT At 11:00am. //sada

## 2022-02-07 ENCOUNTER — TELEPHONE (OUTPATIENT)
Dept: INTERNAL MEDICINE | Facility: CLINIC | Age: 62
End: 2022-02-07
Payer: COMMERCIAL

## 2022-02-07 NOTE — TELEPHONE ENCOUNTER
----- Message from Yu Castillo sent at 2/4/2022 11:07 AM CST -----  Pt called stating she is having issues accessing her my chart virtual appt , can you please give her a call back .[phone]    Thanks

## 2022-02-07 NOTE — TELEPHONE ENCOUNTER
Contacted pt about having trouble with her virtual visit. Pt stated she had an audio call instead. //sada

## 2022-03-15 ENCOUNTER — TELEPHONE (OUTPATIENT)
Dept: INTERNAL MEDICINE | Facility: CLINIC | Age: 62
End: 2022-03-15
Payer: COMMERCIAL

## 2022-03-15 DIAGNOSIS — Z12.11 COLON CANCER SCREENING: Primary | ICD-10-CM

## 2022-03-15 NOTE — TELEPHONE ENCOUNTER
----- Message from Sheila Jhaveri MA sent at 3/15/2022  1:12 PM CDT -----  Regarding: coloscopy request  Pt wants to get preventative colonoscopy. No S/S just felt like it was time.      ----- Message -----  From: Yu Castillo  Sent: 3/15/2022  11:23 AM CDT  To: Melanie Brown Jr Staff    Pt called requesting an order for a colonoscopy , please give a call back at .[phone]   Thanks

## 2022-03-15 NOTE — TELEPHONE ENCOUNTER
Pt was called she just wants routine coloscopy since its been 8 yrs. No problems.      ----- Message from Yu Castillo sent at 3/15/2022 11:22 AM CDT -----  Pt called requesting an order for a colonoscopy , please give a call back at .[phone]   Thanks

## 2022-03-16 ENCOUNTER — TELEPHONE (OUTPATIENT)
Dept: INTERNAL MEDICINE | Facility: CLINIC | Age: 62
End: 2022-03-16
Payer: COMMERCIAL

## 2022-03-16 ENCOUNTER — PATIENT MESSAGE (OUTPATIENT)
Dept: ENDOSCOPY | Facility: HOSPITAL | Age: 62
End: 2022-03-16
Payer: COMMERCIAL

## 2022-03-16 RX ORDER — POLYETHYLENE GLYCOL 3350, SODIUM SULFATE ANHYDROUS, SODIUM BICARBONATE, SODIUM CHLORIDE, POTASSIUM CHLORIDE 236; 22.74; 6.74; 5.86; 2.97 G/4L; G/4L; G/4L; G/4L; G/4L
4 POWDER, FOR SOLUTION ORAL ONCE
Qty: 4000 ML | Refills: 0 | Status: SHIPPED | OUTPATIENT
Start: 2022-03-16 | End: 2022-03-16

## 2022-04-19 ENCOUNTER — PATIENT MESSAGE (OUTPATIENT)
Dept: PREADMISSION TESTING | Facility: HOSPITAL | Age: 62
End: 2022-04-19
Payer: COMMERCIAL

## 2022-04-27 ENCOUNTER — PATIENT MESSAGE (OUTPATIENT)
Dept: ADMINISTRATIVE | Facility: HOSPITAL | Age: 62
End: 2022-04-27
Payer: COMMERCIAL

## 2022-05-05 ENCOUNTER — ANESTHESIA (OUTPATIENT)
Dept: ENDOSCOPY | Facility: HOSPITAL | Age: 62
End: 2022-05-05
Payer: COMMERCIAL

## 2022-05-05 ENCOUNTER — HOSPITAL ENCOUNTER (OUTPATIENT)
Facility: HOSPITAL | Age: 62
Discharge: HOME | End: 2022-05-05
Attending: INTERNAL MEDICINE | Admitting: INTERNAL MEDICINE
Payer: COMMERCIAL

## 2022-05-05 ENCOUNTER — ANESTHESIA EVENT (OUTPATIENT)
Dept: ENDOSCOPY | Facility: HOSPITAL | Age: 62
End: 2022-05-05
Payer: COMMERCIAL

## 2022-05-05 DIAGNOSIS — Z12.11 ENCOUNTER FOR SCREENING COLONOSCOPY: Primary | ICD-10-CM

## 2022-05-05 PROCEDURE — 25000003 PHARM REV CODE 250: Performed by: NURSE ANESTHETIST, CERTIFIED REGISTERED

## 2022-05-05 PROCEDURE — G0121 COLON CA SCRN NOT HI RSK IND: ICD-10-PCS | Mod: ,,, | Performed by: INTERNAL MEDICINE

## 2022-05-05 PROCEDURE — 63600175 PHARM REV CODE 636 W HCPCS: Performed by: NURSE ANESTHETIST, CERTIFIED REGISTERED

## 2022-05-05 PROCEDURE — G0121 COLON CA SCRN NOT HI RSK IND: HCPCS | Mod: ,,, | Performed by: INTERNAL MEDICINE

## 2022-05-05 PROCEDURE — 37000008 HC ANESTHESIA 1ST 15 MINUTES: Performed by: INTERNAL MEDICINE

## 2022-05-05 PROCEDURE — 37000009 HC ANESTHESIA EA ADD 15 MINS: Performed by: INTERNAL MEDICINE

## 2022-05-05 PROCEDURE — G0121 COLON CA SCRN NOT HI RSK IND: HCPCS | Performed by: INTERNAL MEDICINE

## 2022-05-05 RX ORDER — PROPOFOL 10 MG/ML
VIAL (ML) INTRAVENOUS
Status: DISCONTINUED | OUTPATIENT
Start: 2022-05-05 | End: 2022-05-05

## 2022-05-05 RX ORDER — LIDOCAINE HYDROCHLORIDE 10 MG/ML
INJECTION, SOLUTION EPIDURAL; INFILTRATION; INTRACAUDAL; PERINEURAL
Status: DISCONTINUED | OUTPATIENT
Start: 2022-05-05 | End: 2022-05-05

## 2022-05-05 RX ORDER — DEXTROMETHORPHAN/PSEUDOEPHED 2.5-7.5/.8
DROPS ORAL
Status: COMPLETED | OUTPATIENT
Start: 2022-05-05 | End: 2022-05-05

## 2022-05-05 RX ORDER — RIVAROXABAN 2.5 MG/1
2.5 TABLET, FILM COATED ORAL DAILY
COMMUNITY
End: 2023-07-17

## 2022-05-05 RX ADMIN — SIMETHICONE 66.6 MG: 20 SUSPENSION/ DROPS ORAL at 09:05

## 2022-05-05 RX ADMIN — PROPOFOL 30 MG: 10 INJECTION, EMULSION INTRAVENOUS at 09:05

## 2022-05-05 RX ADMIN — SODIUM CHLORIDE, SODIUM LACTATE, POTASSIUM CHLORIDE, AND CALCIUM CHLORIDE: .6; .31; .03; .02 INJECTION, SOLUTION INTRAVENOUS at 09:05

## 2022-05-05 RX ADMIN — PROPOFOL 30 MG: 10 INJECTION, EMULSION INTRAVENOUS at 10:05

## 2022-05-05 RX ADMIN — PROPOFOL 80 MG: 10 INJECTION, EMULSION INTRAVENOUS at 09:05

## 2022-05-05 RX ADMIN — LIDOCAINE HYDROCHLORIDE 50 MG: 10 INJECTION, SOLUTION EPIDURAL; INFILTRATION; INTRACAUDAL; PERINEURAL at 09:05

## 2022-05-05 NOTE — DISCHARGE SUMMARY
[facility] Endoscopy (Hospital)  Discharge Note  Short Stay    Procedure(s) (LRB):  COLONOSCOPY (N/A)    OUTCOME: Patient tolerated treatment/procedure well without complication and is now ready for discharge.    DISPOSITION: Home or Self Care    FINAL DIAGNOSIS:  Encounter for screening colonoscopy    FOLLOWUP: With primary care provider    DISCHARGE INSTRUCTIONS:  No discharge procedures on file.

## 2022-05-05 NOTE — [employer] PATIENT INSTRUCTIONS
Discharge Summary/Instructions after an Endoscopic Procedure  Patient Name: Karen Carpio  Patient MRN: [id number]  Patient YOB: 1960  Thursday, May 5, 2022 Akosua Lemos MD  Dear patient,  As a result of recent federal legislation (The Federal Cures Act), you may   receive lab or pathology results from your procedure in your [facility]   account before your physician is able to contact you. Your physician or   their representative will relay the results to you with their   recommendations at their soonest availability.  Thank you,  RESTRICTIONS:  During your procedure today, you received medications for sedation.  These   medications may affect your judgment, balance and coordination.  Therefore,   for 24 hours, you have the following restrictions:   - DO NOT drive a car, operate machinery, make legal/financial decisions,   sign important papers or drink alcohol.    ACTIVITY:  Today: no heavy lifting, straining or running due to procedural   sedation/anesthesia.  The following day: return to full activity including work.  DIET:  Eat and drink normally unless instructed otherwise.     TREATMENT FOR COMMON SIDE EFFECTS:  - Mild abdominal pain, nausea, belching, bloating or excessive gas:  rest,   eat lightly and use a heating pad.  - Sore Throat: treat with throat lozenges and/or gargle with warm salt   water.  - Because air was used during the procedure, expelling large amounts of air   from your rectum or belching is normal.  - If a bowel prep was taken, you may not have a bowel movement for 1-3 days.    This is normal.  SYMPTOMS TO WATCH FOR AND REPORT TO YOUR PHYSICIAN:  1. Abdominal pain or bloating, other than gas cramps.  2. Chest pain.  3. Back pain.  4. Signs of infection such as: chills or fever occurring within 24 hours   after the procedure.  5. Rectal bleeding, which would show as bright red, maroon, or black stools.   (A tablespoon of blood from the rectum is not serious, especially if    hemorrhoids are present.)  6. Vomiting.  7. Weakness or dizziness.  GO DIRECTLY TO THE NEAREST EMERGENCY ROOM IF YOU HAVE ANY OF THE FOLLOWING:      Difficulty breathing              Chills and/or fever over 101 F   Persistent vomiting and/or vomiting blood   Severe abdominal pain   Severe chest pain   Black, tarry stools   Bleeding- more than one tablespoon   Any other symptom or condition that you feel may need urgent attention  Your doctor recommends these additional instructions:  If any biopsies were taken, your doctors clinic will contact you in 1 to 2   weeks with any results.  - Discharge patient to home (via wheelchair).   - Resume previous diet.   - Continue present medications.   - Repeat colonoscopy in 10 years for screening purposes.   - Patient has a contact number available for emergencies.  The signs and   symptoms of potential delayed complications were discussed with the   patient.  Return to normal activities tomorrow.  Written discharge   instructions were provided to the patient.  For questions, problems or results please call your physician Akosua Lemos MD at Work:  [phone]  If you have any questions about the above instructions, call the GI   department at [phone] or call the endoscopy unit at [phone]   from 7am until 3 pm.  [facility], EMERGENCY ROOM PHONE NUMBER:   [phone]  IF A COMPLICATION OR EMERGENCY SITUATION ARISES AND YOU ARE UNABLE TO REACH   YOUR PHYSICIAN - GO DIRECTLY TO THE EMERGENCY ROOM.  I have read or have had read to me these discharge instructions for my   procedure and have received a written copy.  I understand these   instructions and will follow-up with my physician if I have any questions.     __________________________________       _____________________________________  Nurse Signature                                          Patient/Designated   Responsible Party Signature  MD Akosua Bui  MD Canelo  5/5/2022 10:11:47 AM  [employer]

## 2022-05-05 NOTE — H&P
Short Stay Endoscopy History and Physical    PCP - E Kevin Navarro Jr, MD    Procedure - Colonoscopy  ASA - 2  Mallampati - per anesthesia  History of Anesthesia problems - no  Family history Anesthesia problems -  no     HPI:  This is a 62 y.o. female here for evaluation of :   Active Hospital Problems    Diagnosis  POA    *Encounter for screening colonoscopy [Z12.11]  Not Applicable      Resolved Hospital Problems   No resolved problems to display.         Health Maintenance       Date Due Completion Date    HIV Screening Never done ---    Shingles Vaccine (1 of 2) Never done ---    COVID-19 Vaccine (4 - Booster for Moderna series) 04/27/2022 12/27/2021    Cervical Cancer Screening 06/27/2022 6/27/2019    Override on 1/12/1998: Done    Mammogram 07/06/2022 7/6/2021    Influenza Vaccine (Season Ended) 09/01/2022 10/26/2020    Lipid Panel 01/17/2023 1/17/2022    Override on 11/22/2018: Done (Dr Gresham)    TETANUS VACCINE 09/09/2023 9/9/2013    Colorectal Cancer Screening 12/11/2023 6/22/2018    Override on 7/10/2008: Done (repeat in 7 years)        ROS:  CONSTITUTIONAL: Denies weight change,  fatigue, fevers, chills, night sweats.  CARDIOVASCULAR: Denies chest pain, shortness of breath, orthopnea and edema.  RESPIRATORY: Denies cough, hemoptysis, dyspnea, and wheezing.  GI: See HPI.    Medical History:   Past Medical History:   Diagnosis Date    Acid reflux     Anemia, iron deficiency     Atrial fibrillation     Depression     GERD (gastroesophageal reflux disease)     Hyperlipidemia     Insulin resistance     Dr Drake    Insulin resistance     Pneumonia     Sleep apnea, obstructive     Dr León    Thyroid disease     Dr Drake       Surgical History:   Past Surgical History:   Procedure Laterality Date    COLONOSCOPY      MANDIBLE SURGERY      SLEEVE GASTROPLASTY  02/05/2020    TONSILLECTOMY, ADENOIDECTOMY         Family History:   Family History   Problem Relation Age of Onset    Heart disease  Father     Multiple sclerosis Sister     Heart disease Brother     Diabetes Maternal Grandmother     Cataracts Maternal Grandmother     Cancer Maternal Grandfather     Arthritis Mother     Cataracts Mother        Social History:   Social History     Tobacco Use    Smoking status: Never Smoker    Smokeless tobacco: Never Used   Substance Use Topics    Alcohol use: Yes     Alcohol/week: 1.8 standard drinks     Types: 1 Glasses of wine, 1 Standard drinks or equivalent per week    Drug use: No       Allergies:   Review of patient's allergies indicates:   Allergen Reactions    Diphenhydramine hcl Shortness Of Breath and Rash    Niacin-simvastatin Swelling     Myalgia       Medications:   No current facility-administered medications on file prior to encounter.     Current Outpatient Medications on File Prior to Encounter   Medication Sig Dispense Refill    ARIPiprazole (ABILIFY) 2 MG Tab Take 2.5 mg by mouth once daily.      atorvastatin (LIPITOR) 20 MG tablet       buPROPion (WELLBUTRIN XL) 300 MG 24 hr tablet Take 1 tablet (300 mg total) by mouth once daily. 30 tablet 11    cetirizine (ZYRTEC) 10 MG tablet Take 10 mg by mouth once daily.      escitalopram oxalate (LEXAPRO) 10 MG tablet Take 1.5 tablets (15 mg total) by mouth once daily.      esomeprazole (NEXIUM) 20 MG capsule Take 20 mg by mouth once daily.       FEROSUL 325 mg (65 mg iron) Tab tablet TAKE 1 TABLET BY MOUTH EVERY DAY 30 tablet 11    levothyroxine (SYNTHROID) 150 MCG tablet   1    metoprolol succinate (TOPROL-XL) 25 MG 24 hr tablet   0    multivitamin capsule Take 1 capsule by mouth.      nutritional supplement-fiber Liqd Take by mouth every other day.       vitamin D 1000 units Tab Take 2,000 Units by mouth once daily.      rivaroxaban (XARELTO) 2.5 mg Tab Take 2.5 mg by mouth once daily.         Physical Exam:  Vital Signs: There were no vitals filed for this visit.  General Appearance: Well appearing in no acute  distress  ENT: OP clear  Chest: CTA B  CV: RRR, no m/r/g  Abd: s/nt/nd/nabs  Ext: no edema    Labs:Reviewed    IMP:  Active Hospital Problems    Diagnosis  POA    *Encounter for screening colonoscopy [Z12.11]  Not Applicable      Resolved Hospital Problems   No resolved problems to display.         Plan:   I have explained the risks and benefits of colonoscopy to the patient including but not limited to bleeding, perforation, infection, and death. The patient wishes to proceed.

## 2022-05-05 NOTE — TRANSFER OF CARE
"Anesthesia Transfer of Care Note    Patient: Karen Carpio    Procedure(s) Performed: Procedure(s) (LRB):  COLONOSCOPY (N/A)    Patient location: PACU    Anesthesia Type: MAC    Transport from OR: Transported from OR on room air with adequate spontaneous ventilation    Post pain: adequate analgesia    Post assessment: no apparent anesthetic complications    Post vital signs: stable    Level of consciousness: awake    Nausea/Vomiting: no nausea/vomiting    Complications: none    Transfer of care protocol was followed      Last vitals:   Visit Vitals  /85 (BP Location: Left arm, Patient Position: Sitting)   Pulse 60   Temp 36.3 °C (97.3 °F) (Temporal)   Resp 18   Ht 5' 5" (1.651 m)   Wt 90.7 kg (200 lb)   SpO2 98%   Breastfeeding No   BMI 33.28 kg/m²     "

## 2022-05-05 NOTE — ANESTHESIA POSTPROCEDURE EVALUATION
Anesthesia Post Evaluation    Patient: Karen Carpio    Procedure(s) Performed: Procedure(s) (LRB):  COLONOSCOPY (N/A)    Final Anesthesia Type: MAC      Patient location during evaluation: PACU  Patient participation: Yes- Able to Participate  Level of consciousness: awake and alert  Post-procedure vital signs: reviewed and stable  Pain management: adequate  Airway patency: patent    PONV status at discharge: No PONV  Anesthetic complications: no      Cardiovascular status: blood pressure returned to baseline  Respiratory status: unassisted  Hydration status: euvolemic  Follow-up not needed.          Vitals Value Taken Time   /66 05/05/22 1010   Temp 36.3 °C (97.3 °F) 05/05/22 1010   Pulse 61 05/05/22 1010   Resp 12 05/05/22 1010   SpO2 99 % 05/05/22 1010         No case tracking events are documented in the log.      Pain/Neida Score: Neida Score: 10 (5/5/2022 10:10 AM)

## 2022-05-05 NOTE — ANESTHESIA RELEASE NOTE
"Anesthesia Release from PACU Note    Patient: Karen Carpio    Procedure(s) Performed: Procedure(s) (LRB):  COLONOSCOPY (N/A)    Anesthesia type: MAC    Post pain: Adequate analgesia    Post assessment: no apparent anesthetic complications    Last Vitals:   Visit Vitals  /66 (BP Location: Left arm, Patient Position: Lying)   Pulse 61   Temp 36.3 °C (97.3 °F) (Temporal)   Resp 12   Ht 5' 5" (1.651 m)   Wt 90.7 kg (200 lb)   SpO2 99%   Breastfeeding No   BMI 33.28 kg/m²       Post vital signs: stable    Level of consciousness: awake    Nausea/Vomiting: no nausea/no vomiting    Complications: none    Airway Patency: patent    Respiratory: unassisted    Cardiovascular: stable and blood pressure at baseline    Hydration: euvolemic  "

## 2022-05-05 NOTE — ANESTHESIA PREPROCEDURE EVALUATION
05/05/2022  Karen Carpio is a 62 y.o., female.      Pre-op Assessment    I have reviewed the Patient Summary Reports.     I have reviewed the Nursing Notes. I have reviewed the NPO Status.   I have reviewed the Medications.     Review of Systems  Anesthesia Hx:  No problems with previous Anesthesia    Social:  Non-Smoker    Hematology/Oncology:     Oncology Normal    -- Anemia:   EENT/Dental:EENT/Dental Normal   Cardiovascular:   Dysrhythmias atrial fibrillation hyperlipidemia    Pulmonary:   Pneumonia Sleep Apnea, CPAP    Renal/:  Renal/ Normal     Hepatic/GI:   Bowel Prep. GERD, well controlled    Musculoskeletal:  Musculoskeletal Normal    Neurological:  Neurology Normal    Endocrine:   Hypothyroidism  Morbid Obesity / BMI > 40  Dermatological:  Skin Normal    Psych:   Psychiatric History anxiety depression          Physical Exam  General: Well nourished    Airway:  Mallampati: II   Mouth Opening: Normal  TM Distance: Normal  Tongue: Normal  Neck ROM: Normal ROM    Dental:  Intact    Chest/Lungs:  Clear to auscultation    Heart:  Rate: Normal        Anesthesia Plan  Type of Anesthesia, risks & benefits discussed:    Anesthesia Type: MAC  Intra-op Monitoring Plan: Standard ASA Monitors  Induction:  IV  Informed Consent: Informed consent signed with the Patient and all parties understand the risks and agree with anesthesia plan.  All questions answered. Patient consented to blood products? Yes  ASA Score: 2    Ready For Surgery From Anesthesia Perspective.     .

## 2022-05-06 VITALS
RESPIRATION RATE: 12 BRPM | HEART RATE: 57 BPM | DIASTOLIC BLOOD PRESSURE: 91 MMHG | WEIGHT: 200 LBS | OXYGEN SATURATION: 98 % | SYSTOLIC BLOOD PRESSURE: 135 MMHG | TEMPERATURE: 97 F | HEIGHT: 65 IN | BODY MASS INDEX: 33.32 KG/M2

## 2022-06-17 ENCOUNTER — PATIENT OUTREACH (OUTPATIENT)
Dept: ADMINISTRATIVE | Facility: HOSPITAL | Age: 62
End: 2022-06-17
Payer: COMMERCIAL

## 2022-06-17 NOTE — PROGRESS NOTES
Called patient to schedule overdue PCP appt, Patient answered and was able to schedule appt for 7/8/22 @ 9:20am

## 2022-07-08 ENCOUNTER — OFFICE VISIT (OUTPATIENT)
Dept: INTERNAL MEDICINE | Facility: CLINIC | Age: 62
End: 2022-07-08
Payer: COMMERCIAL

## 2022-07-08 VITALS
BODY MASS INDEX: 33.69 KG/M2 | OXYGEN SATURATION: 96 % | SYSTOLIC BLOOD PRESSURE: 120 MMHG | HEART RATE: 67 BPM | DIASTOLIC BLOOD PRESSURE: 78 MMHG | HEIGHT: 65 IN | TEMPERATURE: 97 F | WEIGHT: 202.19 LBS

## 2022-07-08 DIAGNOSIS — Z00.00 WELL ADULT EXAM: Primary | ICD-10-CM

## 2022-07-08 DIAGNOSIS — F33.1 MODERATE EPISODE OF RECURRENT MAJOR DEPRESSIVE DISORDER: ICD-10-CM

## 2022-07-08 DIAGNOSIS — E78.5 HYPERLIPIDEMIA, UNSPECIFIED HYPERLIPIDEMIA TYPE: ICD-10-CM

## 2022-07-08 DIAGNOSIS — D50.9 IRON DEFICIENCY ANEMIA, UNSPECIFIED IRON DEFICIENCY ANEMIA TYPE: ICD-10-CM

## 2022-07-08 DIAGNOSIS — Z98.84 S/P BARIATRIC SURGERY: ICD-10-CM

## 2022-07-08 DIAGNOSIS — E88.819 INSULIN RESISTANCE: ICD-10-CM

## 2022-07-08 DIAGNOSIS — I48.0 PAROXYSMAL ATRIAL FIBRILLATION: ICD-10-CM

## 2022-07-08 DIAGNOSIS — E03.4 HYPOTHYROIDISM DUE TO ACQUIRED ATROPHY OF THYROID: ICD-10-CM

## 2022-07-08 DIAGNOSIS — K21.9 GASTROESOPHAGEAL REFLUX DISEASE WITHOUT ESOPHAGITIS: ICD-10-CM

## 2022-07-08 PROCEDURE — 3008F PR BODY MASS INDEX (BMI) DOCUMENTED: ICD-10-PCS | Mod: CPTII,S$GLB,, | Performed by: PEDIATRICS

## 2022-07-08 PROCEDURE — 99999 PR PBB SHADOW E&M-EST. PATIENT-LVL V: CPT | Mod: PBBFAC,,, | Performed by: PEDIATRICS

## 2022-07-08 PROCEDURE — 1160F PR REVIEW ALL MEDS BY PRESCRIBER/CLIN PHARMACIST DOCUMENTED: ICD-10-PCS | Mod: CPTII,S$GLB,, | Performed by: PEDIATRICS

## 2022-07-08 PROCEDURE — 3074F PR MOST RECENT SYSTOLIC BLOOD PRESSURE < 130 MM HG: ICD-10-PCS | Mod: CPTII,S$GLB,, | Performed by: PEDIATRICS

## 2022-07-08 PROCEDURE — 1160F RVW MEDS BY RX/DR IN RCRD: CPT | Mod: CPTII,S$GLB,, | Performed by: PEDIATRICS

## 2022-07-08 PROCEDURE — 3008F BODY MASS INDEX DOCD: CPT | Mod: CPTII,S$GLB,, | Performed by: PEDIATRICS

## 2022-07-08 PROCEDURE — 3078F PR MOST RECENT DIASTOLIC BLOOD PRESSURE < 80 MM HG: ICD-10-PCS | Mod: CPTII,S$GLB,, | Performed by: PEDIATRICS

## 2022-07-08 PROCEDURE — 99999 PR PBB SHADOW E&M-EST. PATIENT-LVL V: ICD-10-PCS | Mod: PBBFAC,,, | Performed by: PEDIATRICS

## 2022-07-08 PROCEDURE — 99396 PREV VISIT EST AGE 40-64: CPT | Mod: S$GLB,,, | Performed by: PEDIATRICS

## 2022-07-08 PROCEDURE — 1159F PR MEDICATION LIST DOCUMENTED IN MEDICAL RECORD: ICD-10-PCS | Mod: CPTII,S$GLB,, | Performed by: PEDIATRICS

## 2022-07-08 PROCEDURE — 1159F MED LIST DOCD IN RCRD: CPT | Mod: CPTII,S$GLB,, | Performed by: PEDIATRICS

## 2022-07-08 PROCEDURE — 3078F DIAST BP <80 MM HG: CPT | Mod: CPTII,S$GLB,, | Performed by: PEDIATRICS

## 2022-07-08 PROCEDURE — 99396 PR PREVENTIVE VISIT,EST,40-64: ICD-10-PCS | Mod: S$GLB,,, | Performed by: PEDIATRICS

## 2022-07-08 PROCEDURE — 3074F SYST BP LT 130 MM HG: CPT | Mod: CPTII,S$GLB,, | Performed by: PEDIATRICS

## 2022-07-08 NOTE — PROGRESS NOTES
Subjective:       Patient ID: Karen Carpio is a 62 y.o. female.    Chief Complaint: Annual Exam    Karen Carpio is a 62 y.o. female who presents to clinic for her annual       Depression     Hyperlipidemia     Iron deficiency anemia     Insulin resistance: currently not on Tx     Vitamin D deficiency     Hypothyroidism     Paroxysmal Afib:      S/P bariatric surgery: down 60lbs since surgery, following D&E     GERD:     PMHx, PSHx, SocHx, and FHx reviewed and discussed with patient. Malkas Vidal and Marga notes and labs reviewed with Pt.     Review of Systems   Constitutional: Negative for activity change, appetite change, chills, diaphoresis, fatigue, fever and unexpected weight change.   HENT: Negative for nasal congestion, ear pain, mouth sores, nosebleeds, postnasal drip, rhinorrhea, sneezing and sore throat.    Eyes: Negative for photophobia, pain, discharge, redness and visual disturbance.   Respiratory: Negative for cough, chest tightness, shortness of breath, wheezing and stridor.    Cardiovascular: Negative for chest pain, palpitations and leg swelling.   Gastrointestinal: Negative for constipation, diarrhea, nausea and vomiting.   Genitourinary: Negative for decreased urine volume, difficulty urinating, dysuria, flank pain, frequency, hematuria and urgency.   Musculoskeletal: Negative for arthralgias, back pain, joint swelling, neck pain and neck stiffness.   Integumentary:  Negative for color change and rash.   Neurological: Negative for dizziness, syncope, speech difficulty, weakness, light-headedness and headaches.   Hematological: Negative for adenopathy. Does not bruise/bleed easily.   Psychiatric/Behavioral: Negative for confusion, decreased concentration, dysphoric mood, hallucinations, sleep disturbance and suicidal ideas. The patient is not nervous/anxious.    All other systems reviewed and are negative.        Objective:      Physical Exam  Vitals and nursing note reviewed.   Constitutional:        General: She is not in acute distress.     Appearance: She is well-developed.   Neck:      Thyroid: No thyromegaly.      Vascular: No JVD.   Cardiovascular:      Rate and Rhythm: Normal rate and regular rhythm.      Heart sounds: Normal heart sounds. No murmur heard.  Pulmonary:      Effort: Pulmonary effort is normal. No respiratory distress.      Breath sounds: Normal breath sounds. No wheezing or rales.   Abdominal:      General: There is no distension.      Palpations: Abdomen is soft. There is no mass.      Tenderness: There is no abdominal tenderness. There is no guarding.   Musculoskeletal:      Right lower leg: No edema.      Left lower leg: No edema.   Lymphadenopathy:      Cervical: No cervical adenopathy.   Skin:     Capillary Refill: Capillary refill takes less than 2 seconds.      Findings: No rash.   Neurological:      General: No focal deficit present.      Mental Status: She is alert and oriented to person, place, and time.      Cranial Nerves: No cranial nerve deficit.      Coordination: Coordination normal.   Psychiatric:         Mood and Affect: Mood normal.         Behavior: Behavior normal.         Thought Content: Thought content normal.         Judgment: Judgment normal.         Assessment:       Problem List Items Addressed This Visit     Depression    Hyperlipidemia    Iron deficiency anemia    Insulin resistance    Hypothyroidism    Paroxysmal atrial fibrillation    S/P bariatric surgery    Gastroesophageal reflux disease without esophagitis      Other Visit Diagnoses     Well adult exam    -  Primary          Plan:     Well adult exam    S/P bariatric surgery    Paroxysmal atrial fibrillation    Iron deficiency anemia, unspecified iron deficiency anemia type    Insulin resistance    Hypothyroidism due to acquired atrophy of thyroid    Hyperlipidemia, unspecified hyperlipidemia type    Gastroesophageal reflux disease without esophagitis    Moderate episode of recurrent major depressive  disorder      HM issues discussed with Pt, modification of D&E for weight loss, Shingrix and COVID boosters recommended, PCV 20 next year, we discussed GLP1 inhibitors for weight loss, she will check coverage with insurance and discuss further with Dr. Drake at September appt    Scribe Attestation:   I, Trae Leary, am scribing for, and in the presence of, Dr. Naif Navarro Jr. I performed the above scribed service and the documentation accurately describes the services I performed. I attest to the accuracy of the note.    I, Dr. Naif Navarro Jr, reviewed documentation as scribed above. I personally performed the services described in this documentation.  I agree that the record reflects my personal performance and is accurate and complete. Naif Navarro Jr., MD.  07/08/2022

## 2022-09-29 ENCOUNTER — TELEPHONE (OUTPATIENT)
Dept: OPHTHALMOLOGY | Facility: CLINIC | Age: 62
End: 2022-09-29
Payer: COMMERCIAL

## 2022-09-29 ENCOUNTER — OFFICE VISIT (OUTPATIENT)
Dept: OPHTHALMOLOGY | Facility: CLINIC | Age: 62
End: 2022-09-29
Payer: COMMERCIAL

## 2022-09-29 DIAGNOSIS — H52.7 REFRACTIVE ERRORS: ICD-10-CM

## 2022-09-29 DIAGNOSIS — H25.13 CATARACT, NUCLEAR SCLEROTIC SENILE, BILATERAL: ICD-10-CM

## 2022-09-29 DIAGNOSIS — E88.819 INSULIN RESISTANCE: Primary | ICD-10-CM

## 2022-09-29 PROCEDURE — 1159F PR MEDICATION LIST DOCUMENTED IN MEDICAL RECORD: ICD-10-PCS | Mod: CPTII,S$GLB,, | Performed by: OPTOMETRIST

## 2022-09-29 PROCEDURE — 99999 PR PBB SHADOW E&M-EST. PATIENT-LVL III: CPT | Mod: PBBFAC,,, | Performed by: OPTOMETRIST

## 2022-09-29 PROCEDURE — 92004 PR EYE EXAM, NEW PATIENT,COMPREHESV: ICD-10-PCS | Mod: S$GLB,,, | Performed by: OPTOMETRIST

## 2022-09-29 PROCEDURE — 92004 COMPRE OPH EXAM NEW PT 1/>: CPT | Mod: S$GLB,,, | Performed by: OPTOMETRIST

## 2022-09-29 PROCEDURE — 1159F MED LIST DOCD IN RCRD: CPT | Mod: CPTII,S$GLB,, | Performed by: OPTOMETRIST

## 2022-09-29 PROCEDURE — 99999 PR PBB SHADOW E&M-EST. PATIENT-LVL III: ICD-10-PCS | Mod: PBBFAC,,, | Performed by: OPTOMETRIST

## 2022-09-29 NOTE — PROGRESS NOTES
HPI    Decrease night visual acuity with glasses.  Halos around vision at night.  New patient last eye exam 07/18/2018 [facility].  Update glasses RX.  Last edited by Meghann Galindo MA on 9/29/2022 10:50 AM.            Assessment /Plan     For exam results, see Encounter Report.    Insulin resistance    Cataract, nuclear sclerotic senile, bilateral    Refractive errors      No Background Diabetic Retinopathy    Significant cataracts OU, discussed cataract surgery including Specialty IOL's  Myopic shift, failed glare test OU, refer to Dr Tomlin  Consult Ophthalmology for cataract evaluation at next available appointment.

## 2022-12-01 ENCOUNTER — OFFICE VISIT (OUTPATIENT)
Dept: OPHTHALMOLOGY | Facility: CLINIC | Age: 62
End: 2022-12-01
Payer: COMMERCIAL

## 2022-12-01 ENCOUNTER — DOCUMENTATION ONLY (OUTPATIENT)
Dept: OPHTHALMOLOGY | Facility: CLINIC | Age: 62
End: 2022-12-01
Payer: COMMERCIAL

## 2022-12-01 DIAGNOSIS — E11.9 TYPE 2 DIABETES MELLITUS WITHOUT COMPLICATION, WITHOUT LONG-TERM CURRENT USE OF INSULIN: ICD-10-CM

## 2022-12-01 DIAGNOSIS — H25.12 NUCLEAR SCLEROSIS OF LEFT EYE: ICD-10-CM

## 2022-12-01 DIAGNOSIS — H25.11 NUCLEAR SCLEROSIS OF RIGHT EYE: Primary | ICD-10-CM

## 2022-12-01 PROCEDURE — 99204 PR OFFICE/OUTPT VISIT, NEW, LEVL IV, 45-59 MIN: ICD-10-PCS | Mod: S$GLB,,, | Performed by: STUDENT IN AN ORGANIZED HEALTH CARE EDUCATION/TRAINING PROGRAM

## 2022-12-01 PROCEDURE — 99204 OFFICE O/P NEW MOD 45 MIN: CPT | Mod: S$GLB,,, | Performed by: STUDENT IN AN ORGANIZED HEALTH CARE EDUCATION/TRAINING PROGRAM

## 2022-12-01 PROCEDURE — 2023F DILAT RTA XM W/O RTNOPTHY: CPT | Mod: CPTII,S$GLB,, | Performed by: STUDENT IN AN ORGANIZED HEALTH CARE EDUCATION/TRAINING PROGRAM

## 2022-12-01 PROCEDURE — 99999 PR PBB SHADOW E&M-EST. PATIENT-LVL III: CPT | Mod: PBBFAC,,, | Performed by: STUDENT IN AN ORGANIZED HEALTH CARE EDUCATION/TRAINING PROGRAM

## 2022-12-01 PROCEDURE — 1160F RVW MEDS BY RX/DR IN RCRD: CPT | Mod: CPTII,S$GLB,, | Performed by: STUDENT IN AN ORGANIZED HEALTH CARE EDUCATION/TRAINING PROGRAM

## 2022-12-01 PROCEDURE — 1159F MED LIST DOCD IN RCRD: CPT | Mod: CPTII,S$GLB,, | Performed by: STUDENT IN AN ORGANIZED HEALTH CARE EDUCATION/TRAINING PROGRAM

## 2022-12-01 PROCEDURE — 1159F PR MEDICATION LIST DOCUMENTED IN MEDICAL RECORD: ICD-10-PCS | Mod: CPTII,S$GLB,, | Performed by: STUDENT IN AN ORGANIZED HEALTH CARE EDUCATION/TRAINING PROGRAM

## 2022-12-01 PROCEDURE — 99999 PR PBB SHADOW E&M-EST. PATIENT-LVL III: ICD-10-PCS | Mod: PBBFAC,,, | Performed by: STUDENT IN AN ORGANIZED HEALTH CARE EDUCATION/TRAINING PROGRAM

## 2022-12-01 PROCEDURE — 1160F PR REVIEW ALL MEDS BY PRESCRIBER/CLIN PHARMACIST DOCUMENTED: ICD-10-PCS | Mod: CPTII,S$GLB,, | Performed by: STUDENT IN AN ORGANIZED HEALTH CARE EDUCATION/TRAINING PROGRAM

## 2022-12-01 PROCEDURE — 92025 CORNEAL TOPOGRAPHY - OD - RIGHT EYE: ICD-10-PCS | Mod: RT,S$GLB,, | Performed by: STUDENT IN AN ORGANIZED HEALTH CARE EDUCATION/TRAINING PROGRAM

## 2022-12-01 PROCEDURE — 92136 OPHTHALMIC BIOMETRY: CPT | Mod: RT,S$GLB,, | Performed by: STUDENT IN AN ORGANIZED HEALTH CARE EDUCATION/TRAINING PROGRAM

## 2022-12-01 PROCEDURE — 2023F PR DILATED RETINAL EXAM W/O EVID OF RETINOPATHY: ICD-10-PCS | Mod: CPTII,S$GLB,, | Performed by: STUDENT IN AN ORGANIZED HEALTH CARE EDUCATION/TRAINING PROGRAM

## 2022-12-01 PROCEDURE — 92136 IOL MASTER - OD - RIGHT EYE: ICD-10-PCS | Mod: RT,S$GLB,, | Performed by: STUDENT IN AN ORGANIZED HEALTH CARE EDUCATION/TRAINING PROGRAM

## 2022-12-01 PROCEDURE — 92025 CPTRIZED CORNEAL TOPOGRAPHY: CPT | Mod: RT,S$GLB,, | Performed by: STUDENT IN AN ORGANIZED HEALTH CARE EDUCATION/TRAINING PROGRAM

## 2022-12-01 RX ORDER — PREDNISOLONE ACETATE 10 MG/ML
1 SUSPENSION/ DROPS OPHTHALMIC 4 TIMES DAILY
Qty: 5 ML | Refills: 1 | Status: SHIPPED | OUTPATIENT
Start: 2022-12-01 | End: 2023-07-17

## 2022-12-01 NOTE — PROGRESS NOTES
HPI     Cataract     Additional comments:   Lab Results       Component                Value               Date                       HGBA1C                   5.4                 04/24/2017                              Comments    Pt in today for a cat eval. Pt states her vision has decreased over the   years. Denies any ocular pain or discomfort. C/o blurred vision , glare,   trouble driving at night , and seeing the television over the past several   months    Which eye is most affected? OD > OS      Do you have difficulty, even with glasses, with the following activities?    Reading small print such as labels on medicine bottles, a telephone book,   or food labels.   no  Reading a newspaper or book?  no  Seeing steps, stairs, or curbs  no  Reading traffic signs, street signs, or store signs  yes  Doing fine handwork like sewing, knitting, crocheting or carpentry?  no  Writing checks or filling out forms  no  Playing games such as bingo, dominos, card games or mahjong?  no  Watching television?  yes  Driving at night?  yes             Last edited by Fatimah Dietrich on 12/1/2022  1:58 PM.            Assessment /Plan     For exam results, see Encounter Report.    Nuclear sclerosis of right eye- Visually Significant Cataract OU  Patient reports decreased vision consistent with the clinical amount of lenticular opacity, which reaches the level of visual significance and affects activities of daily living including reading and glare. Risks, benefits, and alternatives to cataract surgery were discussed.  Discussion of risks included possibility of infection as well as permanent vision loss.The pt expressed a desire to proceed with surgery with the potential for some reasonable degree of visual improvement. Recommended regular use of artificial tears and good lid hygiene to optimize surgical outcome.     Discussed IOL options and refractive outcomes for this patient.    Phaco right eye,   Topical  Will aim for  Multifocal - Vivity IOL    Intraop Kenalog 40: no    Post op gtts:   Durezol or PF      Discussed that vision may be limited by:  none    Dilation: good  Alpha Blockers: none    SS record completed, IOL master reviewed, external referral completed.   Referral to [facility] for Ophthalmic surgery  Prescriptions sent for preoperative medications  Explained that patient may need glasses after surgery.      Nuclear sclerosis of left eye- Follow    Type 2 diabetes mellitus without complication, without long-term current use of insulin- last A1c 5.2   Strict BG control, f/u w/ PCP, and annual DFE  Stressed importance of DM control to preserve vision        Return to clinic for PCIOL OD Vivity

## 2022-12-01 NOTE — PROGRESS NOTES
Short Stay Record    Diagnosis: Nuclear Sclerotic Cataract right    CC: Blurry Vision     HPI:  Karen Carpio is a 62 y.o. female who presents for evaluation prior to ophthalmic surgery. No current complaints.     Past Medical History:   Diagnosis Date    Acid reflux     Anemia, iron deficiency     Atrial fibrillation     Cataract     Depression     GERD (gastroesophageal reflux disease)     Hyperlipidemia     Insulin resistance 09/2022    Dr Drake BS doesn't check 09/29/2022    Insulin resistance     Pneumonia     Sleep apnea, obstructive     Dr León    Thyroid disease     Dr Drake     Past Surgical History:   Procedure Laterality Date    ABDOMINOPLASTY      COLONOSCOPY      COLONOSCOPY N/A 05/05/2022    Procedure: COLONOSCOPY;  Surgeon: Akosua Lemos MD;  Location: [facility];  Service: Endoscopy;  Laterality: N/A;    MANDIBLE SURGERY      SLEEVE GASTROPLASTY  02/05/2020    TONSILLECTOMY, ADENOIDECTOMY       Social History     Tobacco Use    Smoking status: Never    Smokeless tobacco: Never   Substance Use Topics    Alcohol use: Yes     Alcohol/week: 1.8 standard drinks     Types: 1 Glasses of wine, 1 Standard drinks or equivalent per week     Family History   Problem Relation Age of Onset    Arthritis Mother     Cataracts Mother     Hypertension Father     Heart disease Father     Cataracts Sister     Multiple sclerosis Sister     Breast cancer Sister 63    Cataracts Brother     Hypertension Brother     Heart disease Brother     Cataracts Brother     Stroke Maternal Grandmother     Diabetes Maternal Grandmother     Cataracts Maternal Grandmother     Cancer Maternal Grandfather      Review of patient's allergies indicates:   Allergen Reactions    Diphenhydramine hcl Shortness Of Breath and Rash    Niacin-simvastatin Swelling     Myalgia         Current Outpatient Medications:     atorvastatin (LIPITOR) 20 MG tablet, , Disp: , Rfl:     buPROPion (WELLBUTRIN XL) 300 MG 24 hr tablet, Take 1 tablet (300 mg  total) by mouth once daily., Disp: 30 tablet, Rfl: 11    cetirizine (ZYRTEC) 10 MG tablet, Take 10 mg by mouth once daily., Disp: , Rfl:     escitalopram oxalate (LEXAPRO) 10 MG tablet, Take 1.5 tablets (15 mg total) by mouth once daily., Disp: , Rfl:     EScitalopram oxalate (LEXAPRO) 5 MG Tab, Take 5 mg by mouth once daily., Disp: , Rfl:     esomeprazole (NEXIUM) 20 MG capsule, Take 20 mg by mouth once daily. , Disp: , Rfl:     FEROSUL 325 mg (65 mg iron) Tab tablet, TAKE 1 TABLET BY MOUTH EVERY DAY, Disp: 30 tablet, Rfl: 11    levothyroxine (SYNTHROID) 150 MCG tablet, , Disp: , Rfl: 1    metoprolol succinate (TOPROL-XL) 25 MG 24 hr tablet, , Disp: , Rfl: 0    multivitamin capsule, Take 1 capsule by mouth., Disp: , Rfl:     rivaroxaban (XARELTO) 2.5 mg Tab, Take 2.5 mg by mouth once daily., Disp: , Rfl:     vitamin D 1000 units Tab, Take 2,000 Units by mouth once daily., Disp: , Rfl:     Review of Systems:  10 Pt ROS negative except as stated in HPI    Physical Exam:  General Appearance:    A&Ox3, no distress, appears stated age   Head:    Normocephalic, without obvious abnormality, atraumatic   Eyes:    PERRL, EOM's intact   Back:     Symmetric, no curvature   Lungs:     respirations unlabored   Chest Wall:    No tenderness or deformity    Heart:  Abdomen:  Extremities:  Skin:    S1 and S2 present    Soft, non-tender    Extremities normal, atraumatic    Skin color, texture, turgor normal     Patient is stable for ophthalmic surgery under local and MAC.       _

## 2022-12-13 ENCOUNTER — PATIENT MESSAGE (OUTPATIENT)
Dept: OPHTHALMOLOGY | Facility: CLINIC | Age: 62
End: 2022-12-13
Payer: COMMERCIAL

## 2022-12-14 ENCOUNTER — TELEPHONE (OUTPATIENT)
Dept: OPHTHALMOLOGY | Facility: CLINIC | Age: 62
End: 2022-12-14
Payer: COMMERCIAL

## 2022-12-15 ENCOUNTER — OUTSIDE PLACE OF SERVICE (OUTPATIENT)
Dept: OPHTHALMOLOGY | Facility: CLINIC | Age: 62
End: 2022-12-15
Payer: COMMERCIAL

## 2022-12-15 PROCEDURE — 66984 XCAPSL CTRC RMVL W/O ECP: CPT | Mod: RT,,, | Performed by: STUDENT IN AN ORGANIZED HEALTH CARE EDUCATION/TRAINING PROGRAM

## 2022-12-15 PROCEDURE — 66984 PR REMOVAL, CATARACT, W/INSRT INTRAOC LENS, W/O ENDO CYCLO: ICD-10-PCS | Mod: RT,,, | Performed by: STUDENT IN AN ORGANIZED HEALTH CARE EDUCATION/TRAINING PROGRAM

## 2022-12-16 ENCOUNTER — OFFICE VISIT (OUTPATIENT)
Dept: OPHTHALMOLOGY | Facility: CLINIC | Age: 62
End: 2022-12-16
Payer: COMMERCIAL

## 2022-12-16 DIAGNOSIS — Z98.41 CATARACT EXTRACTION STATUS OF EYE, RIGHT: ICD-10-CM

## 2022-12-16 DIAGNOSIS — Z98.890 POST-OPERATIVE STATE: Primary | ICD-10-CM

## 2022-12-16 PROCEDURE — 99999 PR PBB SHADOW E&M-EST. PATIENT-LVL III: ICD-10-PCS | Mod: PBBFAC,,, | Performed by: STUDENT IN AN ORGANIZED HEALTH CARE EDUCATION/TRAINING PROGRAM

## 2022-12-16 PROCEDURE — 1159F MED LIST DOCD IN RCRD: CPT | Mod: CPTII,S$GLB,, | Performed by: STUDENT IN AN ORGANIZED HEALTH CARE EDUCATION/TRAINING PROGRAM

## 2022-12-16 PROCEDURE — 99024 POSTOP FOLLOW-UP VISIT: CPT | Mod: S$GLB,,, | Performed by: STUDENT IN AN ORGANIZED HEALTH CARE EDUCATION/TRAINING PROGRAM

## 2022-12-16 PROCEDURE — 1160F PR REVIEW ALL MEDS BY PRESCRIBER/CLIN PHARMACIST DOCUMENTED: ICD-10-PCS | Mod: CPTII,S$GLB,, | Performed by: STUDENT IN AN ORGANIZED HEALTH CARE EDUCATION/TRAINING PROGRAM

## 2022-12-16 PROCEDURE — 99999 PR PBB SHADOW E&M-EST. PATIENT-LVL III: CPT | Mod: PBBFAC,,, | Performed by: STUDENT IN AN ORGANIZED HEALTH CARE EDUCATION/TRAINING PROGRAM

## 2022-12-16 PROCEDURE — 1159F PR MEDICATION LIST DOCUMENTED IN MEDICAL RECORD: ICD-10-PCS | Mod: CPTII,S$GLB,, | Performed by: STUDENT IN AN ORGANIZED HEALTH CARE EDUCATION/TRAINING PROGRAM

## 2022-12-16 PROCEDURE — 99024 PR POST-OP FOLLOW-UP VISIT: ICD-10-PCS | Mod: S$GLB,,, | Performed by: STUDENT IN AN ORGANIZED HEALTH CARE EDUCATION/TRAINING PROGRAM

## 2022-12-16 PROCEDURE — 1160F RVW MEDS BY RX/DR IN RCRD: CPT | Mod: CPTII,S$GLB,, | Performed by: STUDENT IN AN ORGANIZED HEALTH CARE EDUCATION/TRAINING PROGRAM

## 2022-12-16 NOTE — PROGRESS NOTES
HPI    POD#1 s/p CEIOL OD. Has received appropriate post-op drops. Denies any   discomfort. No new ocular complaints.      1. NS OS  2. PCIOL OD vivity 12/15/2022    OD: Pred QID    Last edited by Fatimah Dietrich on 12/16/2022  9:09 AM.            Assessment /Plan     For exam results, see Encounter Report.    Post-operative state  Cataract extraction status of eye, right- POD#1 S/P CEIOL OD Doing well. Mild edema    Continue gtts to operative eye:  PF QID       Reinstructed in importance of absolute compliance with Post-OP instructions including medications, shield at bedtime, protective glasses during the day, and limitation of activities. Follow up appointments in approximately one and six weeks or call immediately for increased pain, redness or vision loss.     RTC 1 week. MOCT if PH worse than 20/25

## 2022-12-22 ENCOUNTER — DOCUMENTATION ONLY (OUTPATIENT)
Dept: OPHTHALMOLOGY | Facility: CLINIC | Age: 62
End: 2022-12-22

## 2022-12-22 ENCOUNTER — OFFICE VISIT (OUTPATIENT)
Dept: OPHTHALMOLOGY | Facility: CLINIC | Age: 62
End: 2022-12-22
Payer: COMMERCIAL

## 2022-12-22 DIAGNOSIS — Z98.41 CATARACT EXTRACTION STATUS OF EYE, RIGHT: ICD-10-CM

## 2022-12-22 DIAGNOSIS — H25.12 NUCLEAR SCLEROSIS OF LEFT EYE: ICD-10-CM

## 2022-12-22 DIAGNOSIS — Z98.890 POST-OPERATIVE STATE: Primary | ICD-10-CM

## 2022-12-22 PROCEDURE — 92136 IOL MASTER - OS - LEFT EYE: ICD-10-PCS | Mod: LT,S$GLB,, | Performed by: STUDENT IN AN ORGANIZED HEALTH CARE EDUCATION/TRAINING PROGRAM

## 2022-12-22 PROCEDURE — 1160F PR REVIEW ALL MEDS BY PRESCRIBER/CLIN PHARMACIST DOCUMENTED: ICD-10-PCS | Mod: CPTII,S$GLB,, | Performed by: STUDENT IN AN ORGANIZED HEALTH CARE EDUCATION/TRAINING PROGRAM

## 2022-12-22 PROCEDURE — 99999 PR PBB SHADOW E&M-EST. PATIENT-LVL III: CPT | Mod: PBBFAC,,, | Performed by: STUDENT IN AN ORGANIZED HEALTH CARE EDUCATION/TRAINING PROGRAM

## 2022-12-22 PROCEDURE — 1160F RVW MEDS BY RX/DR IN RCRD: CPT | Mod: CPTII,S$GLB,, | Performed by: STUDENT IN AN ORGANIZED HEALTH CARE EDUCATION/TRAINING PROGRAM

## 2022-12-22 PROCEDURE — 99024 POSTOP FOLLOW-UP VISIT: CPT | Mod: S$GLB,,, | Performed by: STUDENT IN AN ORGANIZED HEALTH CARE EDUCATION/TRAINING PROGRAM

## 2022-12-22 PROCEDURE — 1159F PR MEDICATION LIST DOCUMENTED IN MEDICAL RECORD: ICD-10-PCS | Mod: CPTII,S$GLB,, | Performed by: STUDENT IN AN ORGANIZED HEALTH CARE EDUCATION/TRAINING PROGRAM

## 2022-12-22 PROCEDURE — 92025 CPTRIZED CORNEAL TOPOGRAPHY: CPT | Mod: LT,S$GLB,, | Performed by: STUDENT IN AN ORGANIZED HEALTH CARE EDUCATION/TRAINING PROGRAM

## 2022-12-22 PROCEDURE — 92136 OPHTHALMIC BIOMETRY: CPT | Mod: LT,S$GLB,, | Performed by: STUDENT IN AN ORGANIZED HEALTH CARE EDUCATION/TRAINING PROGRAM

## 2022-12-22 PROCEDURE — 1159F MED LIST DOCD IN RCRD: CPT | Mod: CPTII,S$GLB,, | Performed by: STUDENT IN AN ORGANIZED HEALTH CARE EDUCATION/TRAINING PROGRAM

## 2022-12-22 PROCEDURE — 99024 PR POST-OP FOLLOW-UP VISIT: ICD-10-PCS | Mod: S$GLB,,, | Performed by: STUDENT IN AN ORGANIZED HEALTH CARE EDUCATION/TRAINING PROGRAM

## 2022-12-22 PROCEDURE — 99999 PR PBB SHADOW E&M-EST. PATIENT-LVL III: ICD-10-PCS | Mod: PBBFAC,,, | Performed by: STUDENT IN AN ORGANIZED HEALTH CARE EDUCATION/TRAINING PROGRAM

## 2022-12-22 PROCEDURE — 92025 CORNEAL TOPOGRAPHY - OS - LEFT EYE: ICD-10-PCS | Mod: LT,S$GLB,, | Performed by: STUDENT IN AN ORGANIZED HEALTH CARE EDUCATION/TRAINING PROGRAM

## 2022-12-22 RX ORDER — PREDNISOLONE ACETATE 10 MG/ML
1 SUSPENSION/ DROPS OPHTHALMIC EVERY 4 HOURS
Qty: 30 ML | Refills: 11 | Status: SHIPPED | OUTPATIENT
Start: 2022-12-22 | End: 2023-07-17

## 2022-12-22 NOTE — PROGRESS NOTES
HPI     Post-op Evaluation     Additional comments: 1 week post op OD. VA is doing well. Having trouble   with OS seeing close up and blurry vision. No pain or irritation.   Compliant with gtt.           Comments    TRF PT.    1. NS OS  2. PCIOL OD vivity 12/15/2022    OD: Pred QID            Last edited by Pola Jimenez on 12/22/2022  8:18 AM.            Assessment /Plan     For exam results, see Encounter Report.    Post-operative state  Cataract extraction status of eye, right- Impression/Plan  POW#1 S/P CEIOL OD : Doing well with no evidence of infection. Healing well    PF Taper 4-3-2-1 then stop    Pt given and instructed in one week postop instructions. Can resume normal activitites and d/c eye shield. OTC reading glasses can be used until evaluated for final MR. Follow up in one month or PRN pain, redness, vision loss, or other concerns.      Nuclear sclerosis of left eye- Patient reports decreased vision in the fellow eye consistent with the clinical amount of lenticular opacity, which reaches the level of visual significance and affects activities of daily living including reading and glare. Risks, benefits, and alternatives to cataract surgery were discussed and pt desired to schedule cataract surgery. Pt was consented and the biometry and lens options were reviewed.     Phaco left eye,   Topical  Will aim for Multifocal - Vivity IOL *AIM FOR -0.50    Intraop Kenalog 40: no    Post op gtts:   Durezol or PF      Discussed that vision may be limited by:  none    Dilation: good  Alpha Blockers: none    SS record completed, IOL master reviewed, external referral completed.   Referral to [facility] for Ophthalmic surgery  Prescriptions sent for preoperative medications  Explained that patient may need glasses after surgery.    Return for CEIOL in fellow eye

## 2022-12-22 NOTE — PROGRESS NOTES
Short Stay Record    Diagnosis: Nuclear Sclerotic Cataract left    CC: Blurry Vision     HPI:  Karen Carpio is a 62 y.o. female who presents for evaluation prior to ophthalmic surgery. No current complaints.     Past Medical History:   Diagnosis Date    Acid reflux     Anemia, iron deficiency     Atrial fibrillation     Cataract     Depression     GERD (gastroesophageal reflux disease)     Hyperlipidemia     Insulin resistance 09/2022    Dr Drake BS doesn't check 09/29/2022    Insulin resistance     Pneumonia     Sleep apnea, obstructive     Dr León    Thyroid disease     Dr Drake     Past Surgical History:   Procedure Laterality Date    ABDOMINOPLASTY      COLONOSCOPY      COLONOSCOPY N/A 05/05/2022    Procedure: COLONOSCOPY;  Surgeon: Akosua Lemos MD;  Location: [facility];  Service: Endoscopy;  Laterality: N/A;    MANDIBLE SURGERY      SLEEVE GASTROPLASTY  02/05/2020    TONSILLECTOMY, ADENOIDECTOMY       Social History     Tobacco Use    Smoking status: Never    Smokeless tobacco: Never   Substance Use Topics    Alcohol use: Yes     Alcohol/week: 1.8 standard drinks     Types: 1 Glasses of wine, 1 Standard drinks or equivalent per week     Family History   Problem Relation Age of Onset    Arthritis Mother     Cataracts Mother     Hypertension Father     Heart disease Father     Cataracts Sister     Multiple sclerosis Sister     Breast cancer Sister 63    Cataracts Brother     Hypertension Brother     Heart disease Brother     Cataracts Brother     Stroke Maternal Grandmother     Diabetes Maternal Grandmother     Cataracts Maternal Grandmother     Cancer Maternal Grandfather      Review of patient's allergies indicates:   Allergen Reactions    Diphenhydramine hcl Shortness Of Breath and Rash    Niacin-simvastatin Swelling     Myalgia         Current Outpatient Medications:     atorvastatin (LIPITOR) 20 MG tablet, , Disp: , Rfl:     buPROPion (WELLBUTRIN XL) 300 MG 24 hr tablet, Take 1 tablet (300 mg  total) by mouth once daily., Disp: 30 tablet, Rfl: 11    cetirizine (ZYRTEC) 10 MG tablet, Take 10 mg by mouth once daily., Disp: , Rfl:     escitalopram oxalate (LEXAPRO) 10 MG tablet, Take 1.5 tablets (15 mg total) by mouth once daily., Disp: , Rfl:     EScitalopram oxalate (LEXAPRO) 5 MG Tab, Take 5 mg by mouth once daily., Disp: , Rfl:     esomeprazole (NEXIUM) 20 MG capsule, Take 20 mg by mouth once daily. , Disp: , Rfl:     FEROSUL 325 mg (65 mg iron) Tab tablet, TAKE 1 TABLET BY MOUTH EVERY DAY, Disp: 30 tablet, Rfl: 11    levothyroxine (SYNTHROID) 150 MCG tablet, , Disp: , Rfl: 1    metoprolol succinate (TOPROL-XL) 25 MG 24 hr tablet, , Disp: , Rfl: 0    multivitamin capsule, Take 1 capsule by mouth., Disp: , Rfl:     prednisoLONE acetate (PRED FORTE) 1 % DrpS, Place 1 drop into the right eye 4 (four) times daily., Disp: 5 mL, Rfl: 1    prednisoLONE acetate (PRED FORTE) 1 % DrpS, Place 1 drop into the left eye every 4 (four) hours., Disp: 30 mL, Rfl: 11    rivaroxaban (XARELTO) 2.5 mg Tab, Take 2.5 mg by mouth once daily., Disp: , Rfl:     vitamin D 1000 units Tab, Take 2,000 Units by mouth once daily., Disp: , Rfl:     Review of Systems:  10 Pt ROS negative except as stated in HPI    Physical Exam:  General Appearance:    A&Ox3, no distress, appears stated age   Head:    Normocephalic, without obvious abnormality, atraumatic   Eyes:    PERRL, EOM's intact   Back:     Symmetric, no curvature   Lungs:     respirations unlabored   Chest Wall:    No tenderness or deformity    Heart:  Abdomen:  Extremities:  Skin:    S1 and S2 present    Soft, non-tender    Extremities normal, atraumatic    Skin color, texture, turgor normal     Patient is stable for ophthalmic surgery under local and MAC.       _

## 2022-12-28 ENCOUNTER — OUTSIDE PLACE OF SERVICE (OUTPATIENT)
Dept: OPHTHALMOLOGY | Facility: CLINIC | Age: 62
End: 2022-12-28
Payer: COMMERCIAL

## 2022-12-28 PROCEDURE — 66984 PR REMOVAL, CATARACT, W/INSRT INTRAOC LENS, W/O ENDO CYCLO: ICD-10-PCS | Mod: 79,LT,, | Performed by: STUDENT IN AN ORGANIZED HEALTH CARE EDUCATION/TRAINING PROGRAM

## 2022-12-28 PROCEDURE — 66984 XCAPSL CTRC RMVL W/O ECP: CPT | Mod: 79,LT,, | Performed by: STUDENT IN AN ORGANIZED HEALTH CARE EDUCATION/TRAINING PROGRAM

## 2022-12-29 ENCOUNTER — OFFICE VISIT (OUTPATIENT)
Dept: OPHTHALMOLOGY | Facility: CLINIC | Age: 62
End: 2022-12-29
Payer: COMMERCIAL

## 2022-12-29 DIAGNOSIS — Z98.42 CATARACT EXTRACTION STATUS OF EYE, LEFT: ICD-10-CM

## 2022-12-29 DIAGNOSIS — Z98.890 POST-OPERATIVE STATE: Primary | ICD-10-CM

## 2022-12-29 PROCEDURE — 99999 PR PBB SHADOW E&M-EST. PATIENT-LVL III: CPT | Mod: PBBFAC,,, | Performed by: STUDENT IN AN ORGANIZED HEALTH CARE EDUCATION/TRAINING PROGRAM

## 2022-12-29 PROCEDURE — 99499 UNLISTED E&M SERVICE: CPT | Mod: CSM,LT,, | Performed by: STUDENT IN AN ORGANIZED HEALTH CARE EDUCATION/TRAINING PROGRAM

## 2022-12-29 PROCEDURE — 1159F PR MEDICATION LIST DOCUMENTED IN MEDICAL RECORD: ICD-10-PCS | Mod: CPTII,S$GLB,, | Performed by: STUDENT IN AN ORGANIZED HEALTH CARE EDUCATION/TRAINING PROGRAM

## 2022-12-29 PROCEDURE — 1160F PR REVIEW ALL MEDS BY PRESCRIBER/CLIN PHARMACIST DOCUMENTED: ICD-10-PCS | Mod: CPTII,S$GLB,, | Performed by: STUDENT IN AN ORGANIZED HEALTH CARE EDUCATION/TRAINING PROGRAM

## 2022-12-29 PROCEDURE — 1159F MED LIST DOCD IN RCRD: CPT | Mod: CPTII,S$GLB,, | Performed by: STUDENT IN AN ORGANIZED HEALTH CARE EDUCATION/TRAINING PROGRAM

## 2022-12-29 PROCEDURE — 99499 PR MULTI-FOCAL IOL EVAL: ICD-10-PCS | Mod: CSM,LT,, | Performed by: STUDENT IN AN ORGANIZED HEALTH CARE EDUCATION/TRAINING PROGRAM

## 2022-12-29 PROCEDURE — 99024 PR POST-OP FOLLOW-UP VISIT: ICD-10-PCS | Mod: S$GLB,,, | Performed by: STUDENT IN AN ORGANIZED HEALTH CARE EDUCATION/TRAINING PROGRAM

## 2022-12-29 PROCEDURE — 99024 POSTOP FOLLOW-UP VISIT: CPT | Mod: S$GLB,,, | Performed by: STUDENT IN AN ORGANIZED HEALTH CARE EDUCATION/TRAINING PROGRAM

## 2022-12-29 PROCEDURE — 99999 PR PBB SHADOW E&M-EST. PATIENT-LVL III: ICD-10-PCS | Mod: PBBFAC,,, | Performed by: STUDENT IN AN ORGANIZED HEALTH CARE EDUCATION/TRAINING PROGRAM

## 2022-12-29 PROCEDURE — 1160F RVW MEDS BY RX/DR IN RCRD: CPT | Mod: CPTII,S$GLB,, | Performed by: STUDENT IN AN ORGANIZED HEALTH CARE EDUCATION/TRAINING PROGRAM

## 2022-12-29 NOTE — PROGRESS NOTES
HPI     Post-op Evaluation     Additional comments: Patient is here for 1 day post op. VA is doing good.   No pain or irritation.Compliant with gtts.           Comments    TRF PT.    1. NS OS  2. PCIOL OD vivity 12/15/2022  3.PCIOL OS 12/28/2022    OD: Pred TID  OS: Pred QID            Last edited by Pola Jimenez on 12/29/2022  1:10 PM.            Assessment /Plan     For exam results, see Encounter Report.    Post-operative state  Cataract extraction status of eye, left- POD#1 S/P CEIOL OS Doing well. Mild edema    Continue gtts to operative eye:  PF QID OU      Reinstructed in importance of absolute compliance with Post-OP instructions including medications, shield at bedtime, protective glasses during the day, and limitation of activities. Follow up appointments in approximately one and six weeks or call immediately for increased pain, redness or vision loss.     RTC 1 week. MOCT if PH worse than 20/25

## 2023-01-06 ENCOUNTER — OFFICE VISIT (OUTPATIENT)
Dept: OPHTHALMOLOGY | Facility: CLINIC | Age: 63
End: 2023-01-06
Payer: COMMERCIAL

## 2023-01-06 DIAGNOSIS — Z98.890 POST-OPERATIVE STATE: Primary | ICD-10-CM

## 2023-01-06 PROCEDURE — 1160F RVW MEDS BY RX/DR IN RCRD: CPT | Mod: CPTII,S$GLB,, | Performed by: STUDENT IN AN ORGANIZED HEALTH CARE EDUCATION/TRAINING PROGRAM

## 2023-01-06 PROCEDURE — 1159F MED LIST DOCD IN RCRD: CPT | Mod: CPTII,S$GLB,, | Performed by: STUDENT IN AN ORGANIZED HEALTH CARE EDUCATION/TRAINING PROGRAM

## 2023-01-06 PROCEDURE — 99024 PR POST-OP FOLLOW-UP VISIT: ICD-10-PCS | Mod: S$GLB,,, | Performed by: STUDENT IN AN ORGANIZED HEALTH CARE EDUCATION/TRAINING PROGRAM

## 2023-01-06 PROCEDURE — 99024 POSTOP FOLLOW-UP VISIT: CPT | Mod: S$GLB,,, | Performed by: STUDENT IN AN ORGANIZED HEALTH CARE EDUCATION/TRAINING PROGRAM

## 2023-01-06 PROCEDURE — 99999 PR PBB SHADOW E&M-EST. PATIENT-LVL III: ICD-10-PCS | Mod: PBBFAC,,, | Performed by: STUDENT IN AN ORGANIZED HEALTH CARE EDUCATION/TRAINING PROGRAM

## 2023-01-06 PROCEDURE — 1160F PR REVIEW ALL MEDS BY PRESCRIBER/CLIN PHARMACIST DOCUMENTED: ICD-10-PCS | Mod: CPTII,S$GLB,, | Performed by: STUDENT IN AN ORGANIZED HEALTH CARE EDUCATION/TRAINING PROGRAM

## 2023-01-06 PROCEDURE — 99999 PR PBB SHADOW E&M-EST. PATIENT-LVL III: CPT | Mod: PBBFAC,,, | Performed by: STUDENT IN AN ORGANIZED HEALTH CARE EDUCATION/TRAINING PROGRAM

## 2023-01-06 PROCEDURE — 1159F PR MEDICATION LIST DOCUMENTED IN MEDICAL RECORD: ICD-10-PCS | Mod: CPTII,S$GLB,, | Performed by: STUDENT IN AN ORGANIZED HEALTH CARE EDUCATION/TRAINING PROGRAM

## 2023-01-06 NOTE — PROGRESS NOTES
HPI     Post-op Evaluation     Additional comments: Pt in today for a 1 week post-op. Pt states her   vision is doing well. Denies any ocular pain or discomfort. Pt states   she's using her drops.            Comments    1. PCIOL OD vivity 12/15/2022  PCIOL OS 12/28/2022    OU: Pred QID            Last edited by Fatimah Dietrich on 1/6/2023 10:46 AM.            Assessment /Plan     For exam results, see Encounter Report.    Post-operative state  Impression/Plan  POW#1 S/P CEIOL OS : Doing well with no evidence of infection. Healing well    PF Taper 4-3-2-1 then stop    Pt given and instructed in one week postop instructions. Can resume normal activitites and d/c eye shield. OTC reading glasses can be used until evaluated for final MR. Follow up in one month with Dr. Jay or PRN pain, redness, vision loss, or other concerns.

## 2023-02-03 ENCOUNTER — PATIENT MESSAGE (OUTPATIENT)
Dept: OPHTHALMOLOGY | Facility: CLINIC | Age: 63
End: 2023-02-03

## 2023-02-03 ENCOUNTER — OFFICE VISIT (OUTPATIENT)
Dept: OPHTHALMOLOGY | Facility: CLINIC | Age: 63
End: 2023-02-03
Payer: MEDICARE

## 2023-02-03 DIAGNOSIS — Z98.890 POST-OPERATIVE STATE: Primary | ICD-10-CM

## 2023-02-03 DIAGNOSIS — Z96.1 PSEUDOPHAKIA OF BOTH EYES: ICD-10-CM

## 2023-02-03 PROCEDURE — 99999 PR PBB SHADOW E&M-EST. PATIENT-LVL III: ICD-10-PCS | Mod: PBBFAC,,, | Performed by: OPTOMETRIST

## 2023-02-03 PROCEDURE — 99024 PR POST-OP FOLLOW-UP VISIT: ICD-10-PCS | Mod: POP,,, | Performed by: OPTOMETRIST

## 2023-02-03 PROCEDURE — 92015 DETERMINE REFRACTIVE STATE: CPT | Mod: ,,, | Performed by: OPTOMETRIST

## 2023-02-03 PROCEDURE — 99213 OFFICE O/P EST LOW 20 MIN: CPT | Mod: PBBFAC | Performed by: OPTOMETRIST

## 2023-02-03 PROCEDURE — 92015 PR REFRACTION: ICD-10-PCS | Mod: ,,, | Performed by: OPTOMETRIST

## 2023-02-03 PROCEDURE — 99999 PR PBB SHADOW E&M-EST. PATIENT-LVL III: CPT | Mod: PBBFAC,,, | Performed by: OPTOMETRIST

## 2023-02-03 PROCEDURE — 99024 POSTOP FOLLOW-UP VISIT: CPT | Mod: POP,,, | Performed by: OPTOMETRIST

## 2023-02-03 RX ORDER — ARIPIPRAZOLE 5 MG/1
5 TABLET ORAL
COMMUNITY
Start: 2023-01-21

## 2023-02-03 NOTE — PROGRESS NOTES
HPI    1 mo S/P  cataract SX OS  per ABR  No visual complaints.  Last eye exam 09/29/2022 [facility].  Last edited by Meghann Galindo MA on 2/3/2023 10:26 AM.            Assessment /Plan     For exam results, see Encounter Report.    Post-operative state    Pseudophakia of both eyes      Residual astigmatism OD    Dispense Final Rx for glasses or may use OTC glasses.  RTC 6 months DFE  Discussed above and answered questions.

## 2023-07-10 RX ORDER — FERROUS SULFATE TAB 325 MG (65 MG ELEMENTAL FE) 325 (65 FE) MG
TAB ORAL
Qty: 30 TABLET | Refills: 11 | Status: SHIPPED | OUTPATIENT
Start: 2023-07-10

## 2023-07-13 ENCOUNTER — OFFICE VISIT (OUTPATIENT)
Dept: INTERNAL MEDICINE | Facility: CLINIC | Age: 63
End: 2023-07-13
Payer: MEDICARE

## 2023-07-13 VITALS
HEART RATE: 71 BPM | DIASTOLIC BLOOD PRESSURE: 70 MMHG | BODY MASS INDEX: 35.18 KG/M2 | OXYGEN SATURATION: 99 % | TEMPERATURE: 97 F | WEIGHT: 211.19 LBS | HEIGHT: 65 IN | RESPIRATION RATE: 17 BRPM | SYSTOLIC BLOOD PRESSURE: 118 MMHG

## 2023-07-13 DIAGNOSIS — Z00.00 WELL ADULT EXAM: Primary | ICD-10-CM

## 2023-07-13 DIAGNOSIS — K21.9 GASTROESOPHAGEAL REFLUX DISEASE WITHOUT ESOPHAGITIS: ICD-10-CM

## 2023-07-13 DIAGNOSIS — I48.0 PAROXYSMAL ATRIAL FIBRILLATION: ICD-10-CM

## 2023-07-13 DIAGNOSIS — Z98.84 S/P BARIATRIC SURGERY: ICD-10-CM

## 2023-07-13 DIAGNOSIS — F33.1 MODERATE EPISODE OF RECURRENT MAJOR DEPRESSIVE DISORDER: ICD-10-CM

## 2023-07-13 DIAGNOSIS — E03.4 HYPOTHYROIDISM DUE TO ACQUIRED ATROPHY OF THYROID: ICD-10-CM

## 2023-07-13 DIAGNOSIS — E55.9 VITAMIN D DEFICIENCY: ICD-10-CM

## 2023-07-13 DIAGNOSIS — E78.5 HYPERLIPIDEMIA, UNSPECIFIED HYPERLIPIDEMIA TYPE: ICD-10-CM

## 2023-07-13 DIAGNOSIS — E66.01 SEVERE OBESITY (BMI 35.0-39.9) WITH COMORBIDITY: ICD-10-CM

## 2023-07-13 DIAGNOSIS — K59.01 SLOW TRANSIT CONSTIPATION: ICD-10-CM

## 2023-07-13 DIAGNOSIS — E88.819 INSULIN RESISTANCE: ICD-10-CM

## 2023-07-13 DIAGNOSIS — D50.9 IRON DEFICIENCY ANEMIA, UNSPECIFIED IRON DEFICIENCY ANEMIA TYPE: ICD-10-CM

## 2023-07-13 PROBLEM — E11.9 TYPE 2 DIABETES MELLITUS WITHOUT COMPLICATION, WITHOUT LONG-TERM CURRENT USE OF INSULIN: Status: ACTIVE | Noted: 2023-07-13

## 2023-07-13 PROBLEM — E11.9 TYPE 2 DIABETES MELLITUS WITHOUT COMPLICATION, WITHOUT LONG-TERM CURRENT USE OF INSULIN: Status: RESOLVED | Noted: 2023-07-13 | Resolved: 2023-07-13

## 2023-07-13 PROCEDURE — 99397 PER PM REEVAL EST PAT 65+ YR: CPT | Mod: GZ,S$PBB,, | Performed by: PEDIATRICS

## 2023-07-13 PROCEDURE — 90677 PCV20 VACCINE IM: CPT | Mod: PBBFAC

## 2023-07-13 PROCEDURE — 99999 PR PBB SHADOW E&M-EST. PATIENT-LVL V: CPT | Mod: PBBFAC,,, | Performed by: PEDIATRICS

## 2023-07-13 PROCEDURE — 99999 PR PBB SHADOW E&M-EST. PATIENT-LVL V: ICD-10-PCS | Mod: PBBFAC,,, | Performed by: PEDIATRICS

## 2023-07-13 PROCEDURE — 99215 OFFICE O/P EST HI 40 MIN: CPT | Mod: PBBFAC,25 | Performed by: PEDIATRICS

## 2023-07-13 PROCEDURE — 99397 PR PREVENTIVE VISIT,EST,65 & OVER: ICD-10-PCS | Mod: GZ,S$PBB,, | Performed by: PEDIATRICS

## 2023-07-13 NOTE — PROGRESS NOTES
Subjective:       Patient ID: Karen Carpio is a 63 y.o. female.    Chief Complaint: Annual Exam      Karen Carpio is a 63 y.o. female who presents to clinic for her annual        Depression: seeing Dr Fields, compliant with meds, stressors of brother's expected death from glioblastoma     Hyperlipidemia: on lipitor     Iron deficiency anemia: stable     Insulin resistance: Sees Dr Batista taking metformin 500 mg BID     Vitamin D deficiency: on oral D     Hypothyroidism: on treatment followed by Dr Batista     Paroxysmal Afib: S/P ablation: followed by Dr Wilcox     S/P bariatric surgery: since surgery, following D&E     GERD: On PPI, quiet     PMHx, PSHx, SocHx, and FHx reviewed and discussed with patient. Grady Drake and Marga notes and labs reviewed with Pt.     Review of Systems   Constitutional:  Negative for fever and unexpected weight change.   HENT:  Negative for congestion and rhinorrhea.    Eyes:  Negative for discharge and redness.   Respiratory:  Negative for cough and wheezing.    Gastrointestinal:  Negative for abdominal pain, constipation, diarrhea and vomiting.   Endocrine: Negative for cold intolerance, heat intolerance, polydipsia, polyphagia and polyuria.   Genitourinary:  Negative for decreased urine volume, difficulty urinating and menstrual problem.   Musculoskeletal:  Negative for arthralgias and joint swelling.   Skin:  Negative for rash and wound.   Neurological:  Negative for syncope and headaches.   Psychiatric/Behavioral:  Positive for dysphoric mood. Negative for behavioral problems, self-injury, sleep disturbance and suicidal ideas. The patient is nervous/anxious.      Objective:      Physical Exam  Constitutional:       Appearance: Normal appearance. She is well-developed.   HENT:      Head: Normocephalic and atraumatic.   Eyes:      General: Lids are normal.      Conjunctiva/sclera: Conjunctivae normal.      Pupils: Pupils are equal, round, and reactive to light.   Neck:      Thyroid:  No thyroid mass or thyromegaly.      Vascular: No carotid bruit.      Trachea: Trachea normal.      Meningeal: Brudzinski's sign and Kernig's sign absent.   Cardiovascular:      Rate and Rhythm: Normal rate and regular rhythm.      Pulses: Normal pulses.      Heart sounds: Normal heart sounds. No murmur heard.  Pulmonary:      Effort: Pulmonary effort is normal.      Breath sounds: Normal breath sounds. No wheezing, rhonchi or rales.   Abdominal:      Palpations: Abdomen is soft.      Tenderness: There is no abdominal tenderness. There is no rebound.   Musculoskeletal:      Cervical back: Normal range of motion and neck supple.   Skin:     General: Skin is warm.      Findings: No abrasion or rash.   Neurological:      Mental Status: She is alert and oriented to person, place, and time.      Cranial Nerves: No cranial nerve deficit.      Sensory: No sensory deficit.      Coordination: Coordination normal.      Gait: Gait normal.      Deep Tendon Reflexes: Reflexes are normal and symmetric.   Psychiatric:         Mood and Affect: Mood normal. Mood is not anxious or depressed.         Speech: Speech normal.         Behavior: Behavior normal. Behavior is cooperative.         Thought Content: Thought content normal.         Judgment: Judgment normal.       Assessment:       1. Well adult exam    2. Type 2 diabetes mellitus without complication, without long-term current use of insulin    3. Severe obesity (BMI 35.0-39.9) with comorbidity    4. Moderate episode of recurrent major depressive disorder    5. Vitamin D deficiency    6. Slow transit constipation    7. Iron deficiency anemia, unspecified iron deficiency anemia type    8. Hypothyroidism due to acquired atrophy of thyroid    9. Hyperlipidemia, unspecified hyperlipidemia type    10. Gastroesophageal reflux disease without esophagitis        Plan:       Well adult exam    Type 2 diabetes mellitus without complication, without long-term current use of  insulin    Severe obesity (BMI 35.0-39.9) with comorbidity    Moderate episode of recurrent major depressive disorder    Vitamin D deficiency    Slow transit constipation    Iron deficiency anemia, unspecified iron deficiency anemia type    Hypothyroidism due to acquired atrophy of thyroid    Hyperlipidemia, unspecified hyperlipidemia type    Gastroesophageal reflux disease without esophagitis    HMI d/w pt. Meds reviewed. D&E, weight loss, maintain subspecialty care. Consider counseling. Tdap and shingles at pharmacy. F/U yearly. Labs reviewed with patient from subspecialty

## 2023-07-27 PROBLEM — N63.25 BREAST LUMP ON LEFT SIDE AT 9 O'CLOCK POSITION: Status: ACTIVE | Noted: 2023-07-27

## 2023-07-27 NOTE — PROGRESS NOTES
[facility]  Toño Thompson MD, FACS  Yonatan Al NP-C      Chief Complaint:   Karen Carpio is a 63 y.o. female presenting today after her imaging workup found a suspicious mass in the left breast for which biopsy has been recommended.    She reports no interval changes on her self-breast examination.     History of Present Illness:   Karen Carpio is a 63 y.o. female who presents on with a left  breast mass for which biopsy has been recommended.  MD:::Holly Lopez MD    Past Medical History:   Diagnosis Date    Acid reflux     Anemia, iron deficiency     Atrial fibrillation     Breast lump on left side at 9 o'clock position 7/27/2023    Cataract     Depression     Family history of breast cancer 7/31/2023    GERD (gastroesophageal reflux disease)     Hyperlipidemia     Insulin resistance 09/2022    Dr Drake BS doesn't check 09/29/2022    Insulin resistance     Pneumonia     Sleep apnea, obstructive     Dr León    Thyroid disease     Dr Drake    Type 2 diabetes mellitus without complication, without long-term current use of insulin 7/13/2023      Past Surgical History:   Procedure Laterality Date    ABDOMINOPLASTY      CATARACT EXTRACTION Bilateral 12/2022    COLONOSCOPY      COLONOSCOPY N/A 05/05/2022    Procedure: COLONOSCOPY;  Surgeon: Akosua Lemos MD;  Location: [facility];  Service: Endoscopy;  Laterality: N/A;    Heart Ablation  06/06/2023    MANDIBLE SURGERY      SLEEVE GASTROPLASTY  02/05/2020    TONSILLECTOMY, ADENOIDECTOMY          Current Outpatient Medications:     ARIPiprazole (ABILIFY) 5 MG Tab, Take 5 mg by mouth., Disp: , Rfl:     atorvastatin (LIPITOR) 20 MG tablet, , Disp: , Rfl:     buPROPion (WELLBUTRIN XL) 300 MG 24 hr tablet, Take 1 tablet (300 mg total) by mouth once daily., Disp: 30 tablet, Rfl: 11    cetirizine (ZYRTEC) 10 MG tablet, Take 10 mg by mouth once daily., Disp: , Rfl:     EScitalopram oxalate (LEXAPRO) 20 MG tablet, Take 20  mg by mouth., Disp: , Rfl:     FEROSUL 325 mg (65 mg iron) Tab tablet, TAKE 1 TABLET BY MOUTH EVERY DAY, Disp: 30 tablet, Rfl: 11    flecainide (TAMBOCOR) 50 MG Tab, TAKE 1 TABLET BY MOUTH AS NEEDED FOR AFIB, Disp: , Rfl:     levothyroxine (SYNTHROID) 200 MCG tablet, Take 200 mcg by mouth every morning., Disp: , Rfl:     metFORMIN (GLUCOPHAGE-XR) 500 MG ER 24hr tablet, Take 1,500 mg by mouth every morning., Disp: , Rfl:     metoprolol succinate (TOPROL-XL) 25 MG 24 hr tablet, , Disp: , Rfl: 0    multivitamin capsule, Take 1 capsule by mouth., Disp: , Rfl:     pantoprazole (PROTONIX) 40 MG tablet, Take 40 mg by mouth every morning., Disp: , Rfl:     vitamin D 1000 units Tab, Take 2,000 Units by mouth once daily., Disp: , Rfl:     XARELTO 20 mg Tab, Take 20 mg by mouth every morning., Disp: , Rfl:    Review of patient's allergies indicates:   Allergen Reactions    Diphenhydramine hcl Shortness Of Breath and Rash    Niacin-simvastatin Swelling     Myalgia      Social History     Tobacco Use    Smoking status: Never    Smokeless tobacco: Never   Substance Use Topics    Alcohol use: Yes     Alcohol/week: 1.8 standard drinks of alcohol     Types: 1 Glasses of wine, 1 Standard drinks or equivalent per week      Family History   Problem Relation Age of Onset    Arthritis Mother     Cataracts Mother     Hypertension Father     Heart disease Father     Cataracts Sister     Multiple sclerosis Sister     Breast cancer Sister 63    Cataracts Brother     Hypertension Brother     Heart disease Brother     Cataracts Brother     Stroke Maternal Grandmother     Diabetes Maternal Grandmother     Cataracts Maternal Grandmother     Cancer Maternal Grandfather         Review of Systems   Integumentary:  Negative for color change, rash, mole/lesion, breast mass, breast discharge and breast tenderness.   Breast: Negative for mass and tenderness       Physical Exam   Constitutional: She appears well-developed. She is cooperative.   HENT:    Head: Normocephalic.   Cardiovascular:  Normal rate and regular rhythm.            Pulmonary/Chest: She exhibits no tenderness and no bony tenderness. Right breast exhibits no mass, no nipple discharge, no skin change and no tenderness. Left breast exhibits mass (1 cm firm mass at 9 o'clock 3 FBFN (palpable only when upright)) and skin change (retraction at 7-8 o'clock). Left breast exhibits no nipple discharge and no tenderness.   Abdominal: Soft. Normal appearance.   Musculoskeletal: Lymphadenopathy:      Upper Body:      Right upper body: No supraclavicular or axillary adenopathy.      Left upper body: No supraclavicular or axillary adenopathy.     Neurological: She is alert.   Skin: No rash noted.       MAMMOGRAM REPORT: She has developed a 1 cm spiculated mass in the left breast    ULTRASOUND REPORT: There is a 9 mm irregular hypoechoic mass with shadowing at the 9 o'clock position of the left breast 9 cm from the nipple    NOTE:::We viewed her films together at today's visit.  We discussed the multiple views obtained and the important findings.  Even benign changes were mentioned and her questions were answered.  She knows that she may receive a formal letter or report from the Radiologist.  She is to contact us if she has questions.    ASSESSMENT and PLAN OF CARE     1. Breast lump on left side at 9 o'clock position  Assessment & Plan:  We discussed her imaging and exam findings.  She does have some skin retraction inferior to the palpable mass.  This is concerning.    She has been given the options of sonocore and excisional biopsy and all indications for each have been discussed. We reviewed her films and reports. She understands the reasons behind obtaining tissue in order to determine a diagnosis.  We discussed the possible outcomes to include: benign, atypical, cancerous and non-correlative.  The last three would require an additional procedure.  She knows that further recommendations will be made  after receiving the pathology report and that additional surgery/interventions may be needed.      PLAN:::: LEFT ISABELOCORE BREAST BIOPSY.    I will call her as soon as her pathology report is received and make additional recommendations.        2. Mass of upper outer quadrant of left breast  -     Ambulatory referral/consult to Breast Surgery    3. Family history of breast cancer  Assessment & Plan:  We discussed her family history and how it could impact her own future risks.  We discussed family vs. genetic history and the importance and implications of each.  Genetic Counseling/Testing was offered, and all questions answered to her satisfaction.  She knows that as additional family members are diagnosed - she will need to let us know as this may change follow up and imaging recommendations.    We had a discussion concerning Breast Cancer Risk Reduction and current NCCN Guidelines. She knows that her risk can be lowered slightly with a healthy lifestyle and minimal ETOH use. Being physically active will also help. She should reduce or stay away from OCPs and HRT as possible.         Medical Decision Making: It is my impression that this patient suffers all conditions contained in this medical document.  Each of these conditions did affect our plan of care and my medical decision making today.  It is my opinion that the medical decision making concerning this patient was of moderate to high difficulty based on the aforementioned conditions.  Any further recommendations will be communicated to the patient by me.  I have reviewed and verified her allergies, list of medications, medical and surgical histories, social history, and a pertinent review of symptoms.    Follow up:  I will phone her with her pathology report and make additional recommendations at that time.

## 2023-07-27 NOTE — ASSESSMENT & PLAN NOTE
We discussed her imaging and exam findings.  She does have some skin retraction inferior to the palpable mass.  This is concerning.    She has been given the options of sonocore and excisional biopsy and all indications for each have been discussed. We reviewed her films and reports. She understands the reasons behind obtaining tissue in order to determine a diagnosis.  We discussed the possible outcomes to include: benign, atypical, cancerous and non-correlative.  The last three would require an additional procedure.  She knows that further recommendations will be made after receiving the pathology report and that additional surgery/interventions may be needed.      PLAN:::: LEFT SONOCORE BREAST BIOPSY.    I will call her as soon as her pathology report is received and make additional recommendations.

## 2023-07-31 ENCOUNTER — OFFICE VISIT (OUTPATIENT)
Dept: SURGERY | Facility: CLINIC | Age: 63
End: 2023-07-31
Payer: MEDICARE

## 2023-07-31 DIAGNOSIS — Z80.3 FAMILY HISTORY OF BREAST CANCER: ICD-10-CM

## 2023-07-31 DIAGNOSIS — N63.25 BREAST LUMP ON LEFT SIDE AT 9 O'CLOCK POSITION: Primary | ICD-10-CM

## 2023-07-31 DIAGNOSIS — N63.21 MASS OF UPPER OUTER QUADRANT OF LEFT BREAST: ICD-10-CM

## 2023-07-31 PROCEDURE — 99204 OFFICE O/P NEW MOD 45 MIN: CPT | Mod: S$GLB,,, | Performed by: SPECIALIST

## 2023-07-31 PROCEDURE — 99204 PR OFFICE/OUTPT VISIT, NEW, LEVL IV, 45-59 MIN: ICD-10-PCS | Mod: S$GLB,,, | Performed by: SPECIALIST

## 2023-07-31 NOTE — ASSESSMENT & PLAN NOTE
We discussed her family history and how it could impact her own future risks.  We discussed family vs. genetic history and the importance and implications of each.  Genetic Counseling/Testing was offered, and all questions answered to her satisfaction.  She knows that as additional family members are diagnosed - she will need to let us know as this may change follow up and imaging recommendations.    We had a discussion concerning Breast Cancer Risk Reduction and current NCCN Guidelines. She knows that her risk can be lowered slightly with a healthy lifestyle and minimal ETOH use. Being physically active will also help. She should reduce or stay away from OCPs and HRT as possible.

## 2023-08-04 PROBLEM — C50.312 MALIGNANT NEOPLASM OF LOWER-INNER QUADRANT OF LEFT BREAST IN FEMALE, ESTROGEN RECEPTOR NEGATIVE: Status: ACTIVE | Noted: 2023-08-04

## 2023-08-04 PROBLEM — Z17.1 MALIGNANT NEOPLASM OF LOWER-INNER QUADRANT OF LEFT BREAST IN FEMALE, ESTROGEN RECEPTOR NEGATIVE: Status: ACTIVE | Noted: 2023-08-04

## 2023-08-04 NOTE — ASSESSMENT & PLAN NOTE
"This diagnosis was discussed with the patient in detail. We discussed a multidisciplinary approach to the treatment of her breast cancer - which can include a Medical and Radiation Oncologist, a Geneticists, a Plastic Surgeon and our Cancer Navigator. We discussed mastectomy with or without reconstruction in addition to BCS as treatment. She knows that because this is a slightly LIQ lesion - that there is the potential to have significant retraction if she chooses BCS.  However, the Plastic Surgeon can offer Oncoplastic Reduction/Lift at the time of resection to diminish this outcome.  We discussed the need to perform a SLN Biopsy and possible Axillary Maria D Dissection and the timing of this procedure.      We discussed the need for chemotherapy (given that this is a Triple Negative cancer) and discussed this in the nuha-adjuvant vs. adjuvant setting . We talked about the possibility and indications of adjuvant Radiation. As this is an ER Negative cancer - she will not require Hormonal Therapy.  She understands that more information will be gained from an MRI, Lab evaluation (and other imaging studies if needed). She will return in one to 2 weeks to discuss all results and upcoming surgery. At least 45 minutes of direct face to face discussions were held with this patient. She will be followed according to NCCN Guidelines. We are available if she has additional questions or concerns.      Genetic Counseling:  We spent some time discussing her situation with respect to the possibilities of a Genetic Mutation.  Given her risk factors, etc. she qualifies for this testing.  All ramifications of testing were discussed.  Genetic Counseling was offered.    Consultants: Jero Tate MD; Thiago Holt MD; Plastic Surgery    In accordance with [place]:1300.154, the brochure "Breast Cancer Treatment Alternatives" was discussed with and given to this patient during this consult for her new diagnosis of breast cancer. " "Attention was given to the advantages, disadvantages, risks, and descriptions of the procedure regarding medically viable and efficacious alternative methods of treatment for breast cancer including surgical, radiological, and chemotherapeutic treatments and/or combinations of these treatments. The problems, benefits and alternatives to breast cancer reconstruction surgery was also discussed. A written summary of these findings is found in my "Consultation Form" that is given to the patient and scanned into the patient medical record.     More than 60 minutes was spent by me on this patient's visit.  This includes (but is not limited to): researching imaging and pathology reports as available and independently interpreting them; reviewing the pertinent medical/surgical history and obtaining aspects of these not already in the record; documenting information/findings in the electronic medical record and communicating all results and a plan of care to the patient and her family.  Face to face time is also included as above.  "

## 2023-08-04 NOTE — PROGRESS NOTES
[facility]  Toño Thompson MD, FACS  Yonatan Al NP-C      Chief Complaint:   Karen Carpio is a 63 y.o. female presenting today to discuss her new diagnosis of left breast cancer, it is workup, and treatment options    History of Present Illness:   Karen Carpio was diagnosed with triple negative left breast cancer and August 2023 at the age of 63.  MD:::Holly Lopez MD; Jero Tate MD; Thiago Holt MD; Plastic Surgery    Past Medical History:   Diagnosis Date    Acid reflux     Anemia, iron deficiency     Atrial fibrillation     Breast lump on left side at 9 o'clock position 7/27/2023    Cataract     Depression     Family history of breast cancer 7/31/2023    GERD (gastroesophageal reflux disease)     Hyperlipidemia     Insulin resistance 09/2022    Dr Drake BS doesn't check 09/29/2022    Insulin resistance     Malignant neoplasm of lower-inner quadrant of left breast in female, estrogen receptor negative 8/4/2023    Pneumonia     Sleep apnea, obstructive     Dr León    Thyroid disease     Dr Drake    Type 2 diabetes mellitus without complication, without long-term current use of insulin 7/13/2023      Past Surgical History:   Procedure Laterality Date    ABDOMINOPLASTY      CATARACT EXTRACTION Bilateral 12/2022    COLONOSCOPY      COLONOSCOPY N/A 05/05/2022    Procedure: COLONOSCOPY;  Surgeon: Akosua Lemos MD;  Location: [facility];  Service: Endoscopy;  Laterality: N/A;    Heart Ablation  06/06/2023    MANDIBLE SURGERY      SLEEVE GASTROPLASTY  02/05/2020    TONSILLECTOMY, ADENOIDECTOMY          Current Outpatient Medications:     ARIPiprazole (ABILIFY) 5 MG Tab, Take 5 mg by mouth., Disp: , Rfl:     atorvastatin (LIPITOR) 20 MG tablet, , Disp: , Rfl:     buPROPion (WELLBUTRIN XL) 300 MG 24 hr tablet, Take 1 tablet (300 mg total) by mouth once daily., Disp: 30 tablet, Rfl: 11    cetirizine (ZYRTEC) 10 MG tablet, Take 10 mg by mouth once daily., Disp:  , Rfl:     EScitalopram oxalate (LEXAPRO) 20 MG tablet, Take 20 mg by mouth., Disp: , Rfl:     FEROSUL 325 mg (65 mg iron) Tab tablet, TAKE 1 TABLET BY MOUTH EVERY DAY, Disp: 30 tablet, Rfl: 11    flecainide (TAMBOCOR) 50 MG Tab, TAKE 1 TABLET BY MOUTH AS NEEDED FOR AFIB, Disp: , Rfl:     levothyroxine (SYNTHROID) 200 MCG tablet, Take 200 mcg by mouth every morning., Disp: , Rfl:     metFORMIN (GLUCOPHAGE-XR) 500 MG ER 24hr tablet, Take 1,500 mg by mouth every morning., Disp: , Rfl:     metoprolol succinate (TOPROL-XL) 25 MG 24 hr tablet, , Disp: , Rfl: 0    multivitamin capsule, Take 1 capsule by mouth., Disp: , Rfl:     pantoprazole (PROTONIX) 40 MG tablet, Take 40 mg by mouth every morning., Disp: , Rfl:     vitamin D 1000 units Tab, Take 2,000 Units by mouth once daily., Disp: , Rfl:     XARELTO 20 mg Tab, Take 20 mg by mouth every morning., Disp: , Rfl:    Review of patient's allergies indicates:   Allergen Reactions    Diphenhydramine hcl Shortness Of Breath and Rash    Niacin-simvastatin Swelling     Myalgia      Social History     Tobacco Use    Smoking status: Never    Smokeless tobacco: Never   Substance Use Topics    Alcohol use: Yes     Alcohol/week: 1.8 standard drinks of alcohol     Types: 1 Glasses of wine, 1 Standard drinks or equivalent per week      Family History   Problem Relation Age of Onset    Arthritis Mother     Cataracts Mother     Hypertension Father     Heart disease Father     Cataracts Sister     Multiple sclerosis Sister     Breast cancer Sister 63    Cataracts Brother     Hypertension Brother     Heart disease Brother     Cataracts Brother     Stroke Maternal Grandmother     Diabetes Maternal Grandmother     Cataracts Maternal Grandmother     Cancer Maternal Grandfather         Review of Systems   Integumentary:  Negative for color change, rash, mole/lesion, breast mass, breast discharge and breast tenderness.   Breast: Negative for mass and tenderness       Physical Exam    Constitutional: She appears well-developed. She is cooperative.   HENT:   Head: Normocephalic.   Cardiovascular:  Normal rate and regular rhythm.            Pulmonary/Chest: She exhibits no tenderness and no bony tenderness. Right breast exhibits no mass, no nipple discharge, no skin change and no tenderness. Left breast exhibits mass (1 cm firm mass at 9 o'clock 3 FBFN (palpable only when upright)) and skin change (retraction at 7-8 o'clock). Left breast exhibits no nipple discharge and no tenderness.   Abdominal: Soft. Normal appearance.   Musculoskeletal: Lymphadenopathy:      Upper Body:      Right upper body: No supraclavicular or axillary adenopathy.      Left upper body: No supraclavicular or axillary adenopathy.     Neurological: She is alert.   Skin: No rash noted.       MAMMOGRAM REPORT: She has developed a 1 cm spiculated mass in the left breast    ULTRASOUND REPORT: There is a 9 mm irregular hypoechoic mass with shadowing at the 9 o'clock position of the left breast 9 cm from the nipple    Note: We again discussed her imaging reports in detail    ASSESSMENT and PLAN OF CARE     1. Malignant neoplasm of lower-inner quadrant of left breast in female, estrogen receptor negative  Assessment & Plan:  This diagnosis was discussed with the patient in detail. We discussed a multidisciplinary approach to the treatment of her breast cancer - which can include a Medical and Radiation Oncologist, a Geneticists, a Plastic Surgeon and our Cancer Navigator. We discussed mastectomy with or without reconstruction in addition to BCS as treatment. She knows that because this is a slightly LIQ lesion - that there is the potential to have significant retraction if she chooses BCS.  However, the Plastic Surgeon can offer Oncoplastic Reduction/Lift at the time of resection to diminish this outcome.  We discussed the need to perform a SLN Biopsy and possible Axillary Maria D Dissection and the timing of this procedure.      We  "discussed the need for chemotherapy (given that this is a Triple Negative cancer) and discussed this in the nuha-adjuvant vs. adjuvant setting . We talked about the possibility and indications of adjuvant Radiation. As this is an ER Negative cancer - she will not require Hormonal Therapy.  She understands that more information will be gained from an MRI, Lab evaluation (and other imaging studies if needed). She will return in one to 2 weeks to discuss all results and upcoming surgery. At least 45 minutes of direct face to face discussions were held with this patient. She will be followed according to NCCN Guidelines. We are available if she has additional questions or concerns.      Genetic Counseling:  We spent some time discussing her situation with respect to the possibilities of a Genetic Mutation.  Given her risk factors, etc. she qualifies for this testing.  All ramifications of testing were discussed.  Genetic Counseling was offered.    Consultants: Jero Tate MD; Thiago Holt MD; Plastic Surgery    In accordance with [place]1300.154, the brochure "Breast Cancer Treatment Alternatives" was discussed with and given to this patient during this consult for her new diagnosis of breast cancer. Attention was given to the advantages, disadvantages, risks, and descriptions of the procedure regarding medically viable and efficacious alternative methods of treatment for breast cancer including surgical, radiological, and chemotherapeutic treatments and/or combinations of these treatments. The problems, benefits and alternatives to breast cancer reconstruction surgery was also discussed. A written summary of these findings is found in my "Consultation Form" that is given to the patient and scanned into the patient medical record.     More than 60 minutes was spent by me on this patient's visit.  This includes (but is not limited to): researching imaging and pathology reports as available and independently " interpreting them; reviewing the pertinent medical/surgical history and obtaining aspects of these not already in the record; documenting information/findings in the electronic medical record and communicating all results and a plan of care to the patient and her family.  Face to face time is also included as above.      Medical Decision Making: It is my impression that the patient suffers from all the listed conditions.  Each of these conditions did affect my Plan of Care and all medical decisions that were rendered.  The medical decision making was of high difficulty based on her co-morbidities and her new diagnosis of Breast Cancer, which can pose a direct threat to her life.  Laboratory, staging, and imaging evaluations are currently pending but will be reviewed once available. Any further recommendations will be communicated to the patient by me either prior to or at her pre-operative visit.  I have reviewed and verified her allergies, list of medications, medical and surgical histories, social history, and a pertinent review of symptoms.    Follow up:  2 weeks for pre op

## 2023-08-05 ENCOUNTER — OFFICE VISIT (OUTPATIENT)
Dept: SURGERY | Facility: CLINIC | Age: 63
End: 2023-08-05
Payer: MEDICARE

## 2023-08-05 DIAGNOSIS — Z17.1 MALIGNANT NEOPLASM OF LOWER-INNER QUADRANT OF LEFT BREAST IN FEMALE, ESTROGEN RECEPTOR NEGATIVE: ICD-10-CM

## 2023-08-05 DIAGNOSIS — C50.312 MALIGNANT NEOPLASM OF LOWER-INNER QUADRANT OF LEFT BREAST IN FEMALE, ESTROGEN RECEPTOR NEGATIVE: ICD-10-CM

## 2023-08-05 PROCEDURE — 99215 OFFICE O/P EST HI 40 MIN: CPT | Mod: S$GLB,,, | Performed by: SPECIALIST

## 2023-08-05 PROCEDURE — 99215 PR OFFICE/OUTPT VISIT, EST, LEVL V, 40-54 MIN: ICD-10-PCS | Mod: S$GLB,,, | Performed by: SPECIALIST

## 2023-08-15 PROBLEM — Z13.71 BRCA NEGATIVE: Status: ACTIVE | Noted: 2023-08-15

## 2023-08-15 NOTE — ASSESSMENT & PLAN NOTE
We reviewed all workup studies, labs and imaging. We discussed Specialty recommendations. All R/B/I and alternatives to treatment have been discussed with the patient with respect to all available surgical options.    The risks of Lumpectomy include bleeding, infection, pain, temporary/chronic swelling, tenderness, formation of scar tissue at the surgical site, need to return to the OR for clearance of margins, and a change in the shape and appearance of the breast, etc.    The Risks of SLN Biopsy include (but are not limited to): bleeding, infection, injury to a major blood vessel, inability to identify the SLN; paralysis/atrophy of some of the shoulder muscles, numbness on the inside of the arm, swelling (lymphedema) in the arm on the side of the operation; poor wound healing, etc.     These risks of Port Placement involve (but are not limited to): bleeding, infections, pneumothorax, injury to a major blood vessel, inability to pass catheter, device failure, medication reaction, poor wound healing, etc.      PLAN:::LEFT LUMPECTOMY AFTER NEEDLE LOCALIATION, LEFT SLN BIOPSY +/- AND, PORT PLACEMENT,  followed by [facility].

## 2023-08-15 NOTE — ASSESSMENT & PLAN NOTE
We discussed the implications of her Negative Gene Test at length.  She knows that testing in the future may be necessary as this test will change as more genetic mutations are identified.  She understands that even though she is gene negative - that she can still develop a breast cancer.

## 2023-08-15 NOTE — PROGRESS NOTES
[facility]  Toño Thompson MD, FACS  Yonatan Al NP-C      Chief Complaint:   Karen Carpio is a 63 y.o. female presenting today to discuss definitive surgical options for her left breast cancer.    History of Present Illness:   Karen Carpio was diagnosed with Triple Negative left breast cancer and August 2023 at the age of 63.  MD:::Holly Lopez MD; Jero Tate MD; Thiago Holt MD; MIAH Islas    Past Medical History:   Diagnosis Date    Acid reflux     Anemia, iron deficiency     Atrial fibrillation     BRCA negative 8/15/2023    Breast lump on left side at 9 o'clock position 7/27/2023    Cataract     Depression     Family history of breast cancer 7/31/2023    GERD (gastroesophageal reflux disease)     Hyperlipidemia     Insulin resistance 09/2022    Dr Drake BS doesn't check 09/29/2022    Insulin resistance     Malignant neoplasm of lower-inner quadrant of left breast in female, estrogen receptor negative 8/4/2023    Pneumonia     Sleep apnea, obstructive     Dr León    Thyroid disease     Dr Drake    Type 2 diabetes mellitus without complication, without long-term current use of insulin 7/13/2023      Past Surgical History:   Procedure Laterality Date    ABDOMINOPLASTY      CATARACT EXTRACTION Bilateral 12/2022    COLONOSCOPY      COLONOSCOPY N/A 05/05/2022    Procedure: COLONOSCOPY;  Surgeon: Akosua Lemos MD;  Location: [facility];  Service: Endoscopy;  Laterality: N/A;    Heart Ablation  06/06/2023    MANDIBLE SURGERY      SLEEVE GASTROPLASTY  02/05/2020    TONSILLECTOMY, ADENOIDECTOMY          Current Outpatient Medications:     ARIPiprazole (ABILIFY) 5 MG Tab, Take 5 mg by mouth., Disp: , Rfl:     atorvastatin (LIPITOR) 20 MG tablet, , Disp: , Rfl:     buPROPion (WELLBUTRIN XL) 300 MG 24 hr tablet, Take 1 tablet (300 mg total) by mouth once daily., Disp: 30 tablet, Rfl: 11    cetirizine (ZYRTEC) 10 MG tablet, Take 10 mg by mouth once daily.,  Disp: , Rfl:     EScitalopram oxalate (LEXAPRO) 20 MG tablet, Take 20 mg by mouth., Disp: , Rfl:     FEROSUL 325 mg (65 mg iron) Tab tablet, TAKE 1 TABLET BY MOUTH EVERY DAY, Disp: 30 tablet, Rfl: 11    flecainide (TAMBOCOR) 50 MG Tab, TAKE 1 TABLET BY MOUTH AS NEEDED FOR AFIB, Disp: , Rfl:     levothyroxine (SYNTHROID) 200 MCG tablet, Take 200 mcg by mouth every morning., Disp: , Rfl:     metFORMIN (GLUCOPHAGE-XR) 500 MG ER 24hr tablet, Take 1,500 mg by mouth every morning., Disp: , Rfl:     metoprolol succinate (TOPROL-XL) 25 MG 24 hr tablet, , Disp: , Rfl: 0    multivitamin capsule, Take 1 capsule by mouth., Disp: , Rfl:     pantoprazole (PROTONIX) 40 MG tablet, Take 40 mg by mouth every morning., Disp: , Rfl:     vitamin D 1000 units Tab, Take 2,000 Units by mouth once daily., Disp: , Rfl:     XARELTO 20 mg Tab, Take 20 mg by mouth every morning., Disp: , Rfl:    Review of patient's allergies indicates:   Allergen Reactions    Diphenhydramine hcl Shortness Of Breath and Rash    Niacin-simvastatin Swelling     Myalgia      Social History     Tobacco Use    Smoking status: Never    Smokeless tobacco: Never   Substance Use Topics    Alcohol use: Yes     Alcohol/week: 1.8 standard drinks of alcohol     Types: 1 Glasses of wine, 1 Standard drinks or equivalent per week      Family History   Problem Relation Age of Onset    Arthritis Mother     Cataracts Mother     Hypertension Father     Heart disease Father     Cataracts Sister     Multiple sclerosis Sister     Breast cancer Sister 63    Cataracts Brother     Hypertension Brother     Heart disease Brother     Cataracts Brother     Stroke Maternal Grandmother     Diabetes Maternal Grandmother     Cataracts Maternal Grandmother     Cancer Maternal Grandfather         Review of Systems   Integumentary:  Negative for color change, rash, mole/lesion, breast mass, breast discharge and breast tenderness.   Breast: Negative for mass and tenderness       Physical Exam    Constitutional: She appears well-developed. She is cooperative.   HENT:   Head: Normocephalic.   Cardiovascular:  Normal rate and regular rhythm.            Pulmonary/Chest: She exhibits no tenderness and no bony tenderness. Right breast exhibits no mass, no nipple discharge, no skin change and no tenderness. Left breast exhibits mass (1 cm firm mass at 9 o'clock 3 FBFN (palpable only when upright)) and skin change (retraction at 7-8 o'clock). Left breast exhibits no nipple discharge and no tenderness.   Abdominal: Soft. Normal appearance.   Musculoskeletal: Lymphadenopathy:      Upper Body:      Right upper body: No supraclavicular or axillary adenopathy.      Left upper body: No supraclavicular or axillary adenopathy.     Neurological: She is alert.   Skin: No rash noted.     MAMMOGRAM REPORT: 1 cm spiculated mass in the left breast    ULTRASOUND REPORT: There is a 9 mm irregular hypoechoic mass with shadowing at the 9 o'clock position of the left breast 9 cm from the nipple    MRI REPORT: Right breast with cyst; Left breast with 1.8 cm enhancing mass    CXR REPORT: normal with NEM    ASSESSMENT and PLAN OF CARE     1. Malignant neoplasm of lower-inner quadrant of left breast in female, estrogen receptor negative  Assessment & Plan:  We reviewed all workup studies, labs and imaging. We discussed Specialty recommendations. All R/B/I and alternatives to treatment have been discussed with the patient with respect to all available surgical options.    The risks of Lumpectomy include bleeding, infection, pain, temporary/chronic swelling, tenderness, formation of scar tissue at the surgical site, need to return to the OR for clearance of margins, and a change in the shape and appearance of the breast, etc.    The Risks of SLN Biopsy include (but are not limited to): bleeding, infection, injury to a major blood vessel, inability to identify the SLN; paralysis/atrophy of some of the shoulder muscles, numbness on the  inside of the arm, swelling (lymphedema) in the arm on the side of the operation; poor wound healing, etc.     These risks of Port Placement involve (but are not limited to): bleeding, infections, pneumothorax, injury to a major blood vessel, inability to pass catheter, device failure, medication reaction, poor wound healing, etc.      PLAN:::LEFT LUMPECTOMY AFTER NEEDLE LOCALIATION, LEFT SLN BIOPSY +/- AND, PORT PLACEMENT,  followed by [facility].      2. BRCA negative  Assessment & Plan:  We discussed the implications of her Negative Gene Test at length.  She knows that testing in the future may be necessary as this test will change as more genetic mutations are identified.  She understands that even though she is gene negative - that she can still develop a breast cancer.        Medical Decision Making: It is my impression that the patient suffers from all the listed conditions.  Each of these conditions did affect my Plan of Care and all medical decisions that were rendered.  The medical decision making was of high difficulty based on her co-morbidities and her new diagnosis of Breast Cancer, which can pose a direct threat to her life.  Laboratory, staging, and imaging evaluations have been reviewed. Any further recommendations will be communicated to the patient by me either prior to or at her pre-operative visit.  I have reviewed and verified her allergies, list of medications, medical and surgical histories, social history, and a pertinent review of symptoms.    Follow up:  2 weeks for post op

## 2023-08-22 ENCOUNTER — OFFICE VISIT (OUTPATIENT)
Dept: SURGERY | Facility: CLINIC | Age: 63
End: 2023-08-22
Payer: MEDICARE

## 2023-08-22 DIAGNOSIS — Z17.1 MALIGNANT NEOPLASM OF LOWER-INNER QUADRANT OF LEFT BREAST IN FEMALE, ESTROGEN RECEPTOR NEGATIVE: ICD-10-CM

## 2023-08-22 DIAGNOSIS — C50.312 MALIGNANT NEOPLASM OF LOWER-INNER QUADRANT OF LEFT BREAST IN FEMALE, ESTROGEN RECEPTOR NEGATIVE: ICD-10-CM

## 2023-08-22 DIAGNOSIS — Z13.71 BRCA NEGATIVE: ICD-10-CM

## 2023-08-22 PROCEDURE — 99214 OFFICE O/P EST MOD 30 MIN: CPT | Mod: S$GLB,,, | Performed by: SPECIALIST

## 2023-08-22 PROCEDURE — 99214 PR OFFICE/OUTPT VISIT, EST, LEVL IV, 30-39 MIN: ICD-10-PCS | Mod: S$GLB,,, | Performed by: SPECIALIST

## 2023-08-24 ENCOUNTER — OUTSIDE PLACE OF SERVICE (OUTPATIENT)
Dept: SURGERY | Facility: CLINIC | Age: 63
End: 2023-08-24
Payer: MEDICARE

## 2023-08-24 PROCEDURE — 38525 BIOPSY/REMOVAL LYMPH NODES: CPT | Mod: 51,LT,, | Performed by: SPECIALIST

## 2023-08-24 PROCEDURE — 19301 PR MASTECTOMY, PARTIAL: ICD-10-PCS | Mod: LT,,, | Performed by: SPECIALIST

## 2023-08-24 PROCEDURE — 38900 PR INTRAOPERATIVE SENTINEL LYMPH NODE ID W DYE INJECTION: ICD-10-PCS | Mod: LT,,, | Performed by: SPECIALIST

## 2023-08-24 PROCEDURE — 19301 PARTIAL MASTECTOMY: CPT | Mod: LT,,, | Performed by: SPECIALIST

## 2023-08-24 PROCEDURE — 38900 IO MAP OF SENT LYMPH NODE: CPT | Mod: LT,,, | Performed by: SPECIALIST

## 2023-08-24 PROCEDURE — 36561 INSERT TUNNELED CV CATH: CPT | Mod: 59,RT,, | Performed by: SPECIALIST

## 2023-08-24 PROCEDURE — 38525 PR BIOPSY/REM LYMPH NODES, AXILLARY: ICD-10-PCS | Mod: 51,LT,, | Performed by: SPECIALIST

## 2023-08-24 PROCEDURE — 36561 PR INSERT TUNNELED CV CATH WITH PORT: ICD-10-PCS | Mod: 59,RT,, | Performed by: SPECIALIST

## 2023-08-28 NOTE — ASSESSMENT & PLAN NOTE
She will be followed according to NCCN Guidelines. She is doing great post operatively and will alert me to any issues. Pathology reports were discussed in detail. All questions were answered and recommendations were made for future treatments/follow-up. We discussed the importance of following up with all Specialists for adjuvant treatment recommendations.

## 2023-08-28 NOTE — PROGRESS NOTES
[facility]  Toño Thompson MD, FACS  Yonatan Al NP-CATALINA      Date of Service: 08/27/2023    Chief Complaint:   Karen Carpio is a 63 y.o. female presenting today for a post operative visit.  She is status post Left NL lumpectomy w/ SLN biopsy and port placement followed by oncoplastics.  She has done well post operatively and has no complaints.    History of Present Illness:   Karen Carpio was diagnosed with Triple Negative left breast cancer and August 2023 at the age of 63. She elected lumpectomy w/ SLN biopsy that showed 1.5 cm Grade III IDC with 1.5 cm DCIS. Negative SLN and Negative margins.  MD:::Holly Lopez MD; Jero Tate MD; Thiago Holt MD; Jamaal Robbins,    Past Medical History:   Diagnosis Date    Acid reflux     Anemia, iron deficiency     Atrial fibrillation     BRCA negative 8/15/2023    Breast lump on left side at 9 o'clock position 7/27/2023    Cataract     Depression     Family history of breast cancer 7/31/2023    GERD (gastroesophageal reflux disease)     Hyperlipidemia     Insulin resistance 09/2022    Dr Drake BS doesn't check 09/29/2022    Insulin resistance     Malignant neoplasm of lower-inner quadrant of left breast in female, estrogen receptor negative 8/4/2023    Pneumonia     Sleep apnea, obstructive     Dr León    Thyroid disease     Dr Drake    Type 2 diabetes mellitus without complication, without long-term current use of insulin 7/13/2023      Past Surgical History:   Procedure Laterality Date    ABDOMINOPLASTY      CATARACT EXTRACTION Bilateral 12/2022    COLONOSCOPY      COLONOSCOPY N/A 05/05/2022    Procedure: COLONOSCOPY;  Surgeon: Akosua Lemos MD;  Location: [facility];  Service: Endoscopy;  Laterality: N/A;    Heart Ablation  06/06/2023    Left NL lumpectomy w/ SLN biopsy and port placement followed by oncoplastics 8/24/23      MANDIBLE SURGERY      SLEEVE GASTROPLASTY  02/05/2020    TONSILLECTOMY, ADENOIDECTOMY           Current Outpatient Medications:     ARIPiprazole (ABILIFY) 5 MG Tab, Take 5 mg by mouth., Disp: , Rfl:     atorvastatin (LIPITOR) 20 MG tablet, , Disp: , Rfl:     buPROPion (WELLBUTRIN XL) 300 MG 24 hr tablet, Take 1 tablet (300 mg total) by mouth once daily., Disp: 30 tablet, Rfl: 11    cetirizine (ZYRTEC) 10 MG tablet, Take 10 mg by mouth once daily., Disp: , Rfl:     EScitalopram oxalate (LEXAPRO) 20 MG tablet, Take 20 mg by mouth., Disp: , Rfl:     FEROSUL 325 mg (65 mg iron) Tab tablet, TAKE 1 TABLET BY MOUTH EVERY DAY, Disp: 30 tablet, Rfl: 11    flecainide (TAMBOCOR) 50 MG Tab, TAKE 1 TABLET BY MOUTH AS NEEDED FOR AFIB, Disp: , Rfl:     levothyroxine (SYNTHROID) 200 MCG tablet, Take 200 mcg by mouth every morning., Disp: , Rfl:     metFORMIN (GLUCOPHAGE-XR) 500 MG ER 24hr tablet, Take 1,500 mg by mouth every morning., Disp: , Rfl:     metoprolol succinate (TOPROL-XL) 25 MG 24 hr tablet, , Disp: , Rfl: 0    multivitamin capsule, Take 1 capsule by mouth., Disp: , Rfl:     pantoprazole (PROTONIX) 40 MG tablet, Take 40 mg by mouth every morning., Disp: , Rfl:     vitamin D 1000 units Tab, Take 2,000 Units by mouth once daily., Disp: , Rfl:     XARELTO 20 mg Tab, Take 20 mg by mouth every morning., Disp: , Rfl:    Review of patient's allergies indicates:   Allergen Reactions    Diphenhydramine hcl Shortness Of Breath and Rash    Niacin-simvastatin Swelling     Myalgia      Social History     Tobacco Use    Smoking status: Never    Smokeless tobacco: Never   Substance Use Topics    Alcohol use: Yes     Alcohol/week: 1.8 standard drinks of alcohol     Types: 1 Glasses of wine, 1 Standard drinks or equivalent per week      Family History   Problem Relation Age of Onset    Arthritis Mother     Cataracts Mother     Hypertension Father     Heart disease Father     Cataracts Sister     Multiple sclerosis Sister     Breast cancer Sister 63    Cataracts Brother     Hypertension Brother     Heart disease Brother      Cataracts Brother     Stroke Maternal Grandmother     Diabetes Maternal Grandmother     Cataracts Maternal Grandmother     Cancer Maternal Grandfather         Review of Systems   Constitutional:  Positive for activity change (slowed down after surgery). Negative for fever.   Respiratory:  Negative for shortness of breath.    Cardiovascular:  Negative for chest pain.   Integumentary:  Positive for breast tenderness (typical post operative). Negative for rash and wound.   Breast: Positive for tenderness (typical post operative).       Physical Exam   Pulmonary/Chest: Effort normal. She exhibits tenderness. She exhibits no swelling.   Right- Deferred. Left- Normal post op changes.   Abdominal: Normal appearance.   Skin: No rash noted.          ASSESSMENT and PLAN OF CARE     1. Malignant neoplasm of lower-inner quadrant of left breast in female, estrogen receptor negative  Assessment & Plan:  She will be followed according to NCCN Guidelines. She is doing great post operatively and will alert me to any issues. Pathology reports were discussed in detail. All questions were answered and recommendations were made for future treatments/follow-up. We discussed the importance of following up with all Specialists for adjuvant treatment recommendations.         Medical Decision Making:  It is my impression that this patient suffers all conditions contained in this medical document.  Each of these conditions did affect our plan of care and my medical decision making today.  It is my opinion that the medical decision making concerning this patient was of minimal  difficulty based on the aforementioned conditions.  Any further recommendations will be communicated to the patient by me.  I have reviewed and verified her allergies, list of medications, medical and surgical histories, social history, and a pertinent review of symptoms.     Follow up: 3 months and prn    For: Physical Examination

## 2023-09-06 ENCOUNTER — OFFICE VISIT (OUTPATIENT)
Dept: SURGERY | Facility: CLINIC | Age: 63
End: 2023-09-06
Payer: MEDICARE

## 2023-09-06 DIAGNOSIS — Z17.1 MALIGNANT NEOPLASM OF LOWER-INNER QUADRANT OF LEFT BREAST IN FEMALE, ESTROGEN RECEPTOR NEGATIVE: Primary | ICD-10-CM

## 2023-09-06 DIAGNOSIS — C50.312 MALIGNANT NEOPLASM OF LOWER-INNER QUADRANT OF LEFT BREAST IN FEMALE, ESTROGEN RECEPTOR NEGATIVE: Primary | ICD-10-CM

## 2023-09-06 PROCEDURE — 99024 PR POST-OP FOLLOW-UP VISIT: ICD-10-PCS | Mod: S$GLB,POP,, | Performed by: NURSE PRACTITIONER

## 2023-09-06 PROCEDURE — 99024 POSTOP FOLLOW-UP VISIT: CPT | Mod: S$GLB,POP,, | Performed by: NURSE PRACTITIONER

## 2023-10-09 ENCOUNTER — OFFICE VISIT (OUTPATIENT)
Dept: OPHTHALMOLOGY | Facility: CLINIC | Age: 63
End: 2023-10-09
Payer: MEDICARE

## 2023-10-09 DIAGNOSIS — Z96.1 PSEUDOPHAKIA OF BOTH EYES: ICD-10-CM

## 2023-10-09 DIAGNOSIS — H52.7 REFRACTIVE ERRORS: ICD-10-CM

## 2023-10-09 DIAGNOSIS — E11.9 DIABETES MELLITUS TYPE 2 WITHOUT RETINOPATHY: Primary | ICD-10-CM

## 2023-10-09 PROCEDURE — 99213 OFFICE O/P EST LOW 20 MIN: CPT | Mod: PBBFAC | Performed by: OPTOMETRIST

## 2023-10-09 PROCEDURE — 99999 PR PBB SHADOW E&M-EST. PATIENT-LVL III: CPT | Mod: PBBFAC,,, | Performed by: OPTOMETRIST

## 2023-10-09 PROCEDURE — 99999 PR PBB SHADOW E&M-EST. PATIENT-LVL III: ICD-10-PCS | Mod: PBBFAC,,, | Performed by: OPTOMETRIST

## 2023-10-09 PROCEDURE — 92014 PR EYE EXAM, EST PATIENT,COMPREHESV: ICD-10-PCS | Mod: S$PBB,,, | Performed by: OPTOMETRIST

## 2023-10-09 PROCEDURE — 92015 DETERMINE REFRACTIVE STATE: CPT | Mod: ,,, | Performed by: OPTOMETRIST

## 2023-10-09 PROCEDURE — 92015 PR REFRACTION: ICD-10-PCS | Mod: ,,, | Performed by: OPTOMETRIST

## 2023-10-09 PROCEDURE — 92014 COMPRE OPH EXAM EST PT 1/>: CPT | Mod: S$PBB,,, | Performed by: OPTOMETRIST

## 2023-10-09 NOTE — PROGRESS NOTES
HPI     6m Post PO with DFE     Additional comments: Pt here for 6m DFE following cataract sx PCIOL OU   (Vivity). Pt states she was  DX with Breast Cancer August 2023. Pt states   her vision is not as good at distance or near. Pt is not sure if Chemo is   causing the changes. Pt now uses reader for near vision. Pt has Vivity   lens.            Comments    TRF PT.    1. PCIOL OD vivity 12/15/2022  PCIOL OS vivity 12/28/2022          Last edited by Estevan Tomlin on 10/9/2023  3:50 PM.            Assessment /Plan     For exam results, see Encounter Report.    Diabetes mellitus type 2 without retinopathy    Pseudophakia of both eyes    Refractive errors      No Background Diabetic Retinopathy  Strict BG control, f/u w/ PCP, and annual DFE  Stressed importance of DM control to preserve vision    Stable IOL OU.    Dispense Final Rx for glasses.  RTC 1 year  Discussed above and answered questions.

## 2023-10-10 ENCOUNTER — PATIENT MESSAGE (OUTPATIENT)
Dept: OPHTHALMOLOGY | Facility: CLINIC | Age: 63
End: 2023-10-10
Payer: MEDICARE

## 2023-11-20 PROBLEM — Z13.71 BRCA NEGATIVE: Status: RESOLVED | Noted: 2023-08-15 | Resolved: 2023-11-20

## 2023-11-22 DIAGNOSIS — E11.9 TYPE 2 DIABETES MELLITUS WITHOUT COMPLICATION: ICD-10-CM

## 2023-12-10 PROBLEM — Z17.1 ESTROGEN RECEPTOR NEGATIVE STATUS (ER-): Status: ACTIVE | Noted: 2023-12-10

## 2023-12-11 ENCOUNTER — OFFICE VISIT (OUTPATIENT)
Dept: SURGERY | Facility: CLINIC | Age: 63
End: 2023-12-11
Payer: MEDICARE

## 2023-12-11 DIAGNOSIS — C50.312 MALIGNANT NEOPLASM OF LOWER-INNER QUADRANT OF LEFT BREAST IN FEMALE, ESTROGEN RECEPTOR NEGATIVE: Primary | ICD-10-CM

## 2023-12-11 DIAGNOSIS — Z17.1 ESTROGEN RECEPTOR NEGATIVE STATUS (ER-): ICD-10-CM

## 2023-12-11 DIAGNOSIS — Z17.1 MALIGNANT NEOPLASM OF LOWER-INNER QUADRANT OF LEFT BREAST IN FEMALE, ESTROGEN RECEPTOR NEGATIVE: Primary | ICD-10-CM

## 2023-12-11 PROCEDURE — 99214 OFFICE O/P EST MOD 30 MIN: CPT | Mod: S$PBB,,, | Performed by: NURSE PRACTITIONER

## 2023-12-11 PROCEDURE — 99213 OFFICE O/P EST LOW 20 MIN: CPT | Mod: PBBFAC,PN | Performed by: NURSE PRACTITIONER

## 2023-12-11 PROCEDURE — 99999 PR PBB SHADOW E&M-EST. PATIENT-LVL III: CPT | Mod: PBBFAC,,, | Performed by: NURSE PRACTITIONER

## 2023-12-11 PROCEDURE — 99214 PR OFFICE/OUTPT VISIT, EST, LEVL IV, 30-39 MIN: ICD-10-PCS | Mod: S$PBB,,, | Performed by: NURSE PRACTITIONER

## 2023-12-11 PROCEDURE — 99999 PR PBB SHADOW E&M-EST. PATIENT-LVL III: ICD-10-PCS | Mod: PBBFAC,,, | Performed by: NURSE PRACTITIONER

## 2023-12-11 NOTE — PROGRESS NOTES
Date of Service: 12/28/2023    Chief Complaint:   Karen Carpio is a 63 y.o. female presenting today for Venous Access Device (Port) removal in the office.  She has completed chemotherapy and her Medical Oncologist has approved Port Removal.  She desires having this removed in the office.    She tolerated chemotherapy well.     History of Present Illness:     Karen Carpio was diagnosed with Triple Negative left breast cancer and August 2023 at the age of 63. She elected lumpectomy w/ SLN biopsy that showed 1.5 cm Grade III IDC with 1.5 cm DCIS. Negative SLN and Negative margins. She completed adjuvant chemotherapy.  MD:::Holly Lopez MD; Jero Tate MD; Thiago Holt MD; Jamaal Robbins MD.    Past Medical History:   Diagnosis Date    Acid reflux     Anemia, iron deficiency     Atrial fibrillation     BRCA negative 08/15/2023    Breast lump on left side at 9 o'clock position 07/27/2023    Cataract     Depression     Estrogen receptor negative status (ER-) 12/10/2023    Family history of breast cancer 07/31/2023    GERD (gastroesophageal reflux disease)     Hyperlipidemia     Insulin resistance 09/2022    Dr Drake BS doesn't check 09/29/2022    Insulin resistance     Malignant neoplasm of lower-inner quadrant of left breast in female, estrogen receptor negative 08/04/2023    Pneumonia     Sleep apnea, obstructive     Dr León    Thyroid disease     Dr Drake    Type 2 diabetes mellitus without complication, without long-term current use of insulin 07/13/2023      Past Surgical History:   Procedure Laterality Date    ABDOMINOPLASTY      CATARACT EXTRACTION Bilateral 12/2022    COLONOSCOPY      COLONOSCOPY N/A 05/05/2022    Procedure: COLONOSCOPY;  Surgeon: Akosua Lemos MD;  Location: [facility];  Service: Endoscopy;  Laterality: N/A;    Heart Ablation  06/06/2023    Left NL lumpectomy w/ SLN biopsy and port placement followed by oncoplastics 8/24/23      MANDIBLE SURGERY      SLEEVE GASTROPLASTY   02/05/2020    TONSILLECTOMY, ADENOIDECTOMY          Current Outpatient Medications:     ARIPiprazole (ABILIFY) 5 MG Tab, Take 5 mg by mouth., Disp: , Rfl:     atorvastatin (LIPITOR) 20 MG tablet, , Disp: , Rfl:     buPROPion (WELLBUTRIN XL) 300 MG 24 hr tablet, Take 1 tablet (300 mg total) by mouth once daily., Disp: 30 tablet, Rfl: 11    cetirizine (ZYRTEC) 10 MG tablet, Take 10 mg by mouth once daily., Disp: , Rfl:     EScitalopram oxalate (LEXAPRO) 20 MG tablet, Take 20 mg by mouth., Disp: , Rfl:     FEROSUL 325 mg (65 mg iron) Tab tablet, TAKE 1 TABLET BY MOUTH EVERY DAY, Disp: 30 tablet, Rfl: 11    flecainide (TAMBOCOR) 50 MG Tab, TAKE 1 TABLET BY MOUTH AS NEEDED FOR AFIB, Disp: , Rfl:     levothyroxine (SYNTHROID) 200 MCG tablet, Take 200 mcg by mouth every morning., Disp: , Rfl:     metFORMIN (GLUCOPHAGE-XR) 500 MG ER 24hr tablet, Take 1,500 mg by mouth every morning., Disp: , Rfl:     metoprolol succinate (TOPROL-XL) 25 MG 24 hr tablet, , Disp: , Rfl: 0    multivitamin capsule, Take 1 capsule by mouth., Disp: , Rfl:     pantoprazole (PROTONIX) 40 MG tablet, Take 40 mg by mouth every morning., Disp: , Rfl:     vitamin D 1000 units Tab, Take 2,000 Units by mouth once daily., Disp: , Rfl:     XARELTO 20 mg Tab, Take 20 mg by mouth every morning., Disp: , Rfl:    Review of patient's allergies indicates:   Allergen Reactions    Diphenhydramine hcl Shortness Of Breath and Rash    Niacin-simvastatin Swelling     Myalgia      Social History     Tobacco Use    Smoking status: Never    Smokeless tobacco: Never   Substance Use Topics    Alcohol use: Yes     Alcohol/week: 1.8 standard drinks of alcohol     Types: 1 Glasses of wine, 1 Standard drinks or equivalent per week      Family History   Problem Relation Age of Onset    Arthritis Mother     Cataracts Mother     Hypertension Father     Heart disease Father     Cataracts Sister     Multiple sclerosis Sister     Breast cancer Sister 63    Cataracts Brother      Hypertension Brother     Heart disease Brother     Cataracts Brother     Stroke Maternal Grandmother     Diabetes Maternal Grandmother     Cataracts Maternal Grandmother     Cancer Maternal Grandfather         Review of Systems   Integumentary:  Negative for color change, rash, mole/lesion, breast mass, breast discharge and breast tenderness.   Breast: Negative for mass and tenderness       Physical Exam   Constitutional: She is cooperative.   HENT:   Head: Normocephalic.   Pulmonary/Chest: She exhibits no mass. Right breast exhibits no inverted nipple, no mass, no nipple discharge, no skin change and no tenderness. Left breast exhibits no inverted nipple, no mass, no nipple discharge, no skin change and no tenderness.   Abdominal: Normal appearance.   Musculoskeletal: Lymphadenopathy:      Upper Body:      Right upper body: No supraclavicular or axillary adenopathy.      Left upper body: No supraclavicular or axillary adenopathy.     Neurological: She is alert.   Skin: No rash noted.          PORT REMOVAL PROCEDURE NOTE:  The patient was placed on the exam table and informed consent (verbal/written) was obtained.  A time out was performed that involved all present and the patient. The procedure area underwent Betadine prepping and the old incision was infiltrated with Lidocaine. Approximately 8 cc was used. A #15 blade scalpel was used to excise the old scar. The port was identified and dissected free from all surrounding structures with the #15 blade. The port was removed and noted to grossly be completely intact. A 4-0 Vicryl suture was placed through the catheter exit site and pressure was held for 5 minutes. No oozing was noted. 4-0 Vicryl sutures were placed in the dermal tissues followed by a 4-0 PDS in a subcuticular fashion. Steri strips were applied. Pressure was held with no oozing or swelling noted.  She was given specific post-procedure instructions and told to call me with any concerns or questions.      ASSESSMENT and PLAN OF CARE     1. Malignant neoplasm of lower-inner quadrant of left breast in female, estrogen receptor negative  Assessment & Plan:  She is doing well and will continue to be followed according to NCCN Guidelines. She understands that her exams have remained stable and is comfortable being followed in a conservative fashion. She understands the importance of monthly self-breast examination and knows to report any and all changes as they occur.    She tolerated port removal in the office well.      Medical Decision Making: It is my impression that this patient suffers all conditions contained in this medical document.  Each of these conditions did affect our plan of care and my medical decision making today.  It is my opinion that the medical decision making concerning this patient was of minimal  difficulty based on the aforementioned conditions.  Any further recommendations will be communicated to the patient by me.  I have reviewed and verified her allergies, list of medications, medical and surgical histories, social history, and a pertinent review of symptoms.     Special Instructions: She was asked to get back into the habit of examining both breasts monthly (to include supraclavicular and axillary basins).  She understands the importance of monthly self-breast examination and is to report changes as they occur.    Follow up:  keep already scheduled appointment and prn

## 2023-12-11 NOTE — PROGRESS NOTES
[facility]  Toño Thompson MD, FACS  Yonatan Al NP-CATALINA      Date of Service: 12/11/2023    Chief Complaint:   Karen Carpio is a 63 y.o. female presenting today for her 3 month follow up to her breast cancer diagnosis.  She is due for a Physical Examination.  She reports no interval changes. She completed adjuvant chemotherapy two weeks ago.     History of Present Illness:   Karen Carpio was diagnosed with Triple Negative left breast cancer and August 2023 at the age of 63. She elected lumpectomy w/ SLN biopsy that showed 1.5 cm Grade III IDC with 1.5 cm DCIS. Negative SLN and Negative margins. She elected adjuvant chemotherapy. MD:::Holly Lopez MD; Jero Tate MD; Thiago Holt MD; Jamaal Robbins MD.    Past Medical History:   Diagnosis Date    Acid reflux     Anemia, iron deficiency     Atrial fibrillation     BRCA negative 08/15/2023    Breast lump on left side at 9 o'clock position 07/27/2023    Cataract     Depression     Estrogen receptor negative status (ER-) 12/10/2023    Family history of breast cancer 07/31/2023    GERD (gastroesophageal reflux disease)     Hyperlipidemia     Insulin resistance 09/2022    Dr Drake BS doesn't check 09/29/2022    Insulin resistance     Malignant neoplasm of lower-inner quadrant of left breast in female, estrogen receptor negative 08/04/2023    Pneumonia     Sleep apnea, obstructive     Dr León    Thyroid disease     Dr Drake    Type 2 diabetes mellitus without complication, without long-term current use of insulin 07/13/2023      Past Surgical History:   Procedure Laterality Date    ABDOMINOPLASTY      CATARACT EXTRACTION Bilateral 12/2022    COLONOSCOPY      COLONOSCOPY N/A 05/05/2022    Procedure: COLONOSCOPY;  Surgeon: Akosua Lemos MD;  Location: [facility];  Service: Endoscopy;  Laterality: N/A;    Heart Ablation  06/06/2023    Left NL lumpectomy w/ SLN biopsy and port placement followed by oncoplastics 8/24/23       MANDIBLE SURGERY      SLEEVE GASTROPLASTY  02/05/2020    TONSILLECTOMY, ADENOIDECTOMY          Current Outpatient Medications:     ARIPiprazole (ABILIFY) 5 MG Tab, Take 5 mg by mouth., Disp: , Rfl:     atorvastatin (LIPITOR) 20 MG tablet, , Disp: , Rfl:     buPROPion (WELLBUTRIN XL) 300 MG 24 hr tablet, Take 1 tablet (300 mg total) by mouth once daily., Disp: 30 tablet, Rfl: 11    cetirizine (ZYRTEC) 10 MG tablet, Take 10 mg by mouth once daily., Disp: , Rfl:     EScitalopram oxalate (LEXAPRO) 20 MG tablet, Take 20 mg by mouth., Disp: , Rfl:     FEROSUL 325 mg (65 mg iron) Tab tablet, TAKE 1 TABLET BY MOUTH EVERY DAY, Disp: 30 tablet, Rfl: 11    flecainide (TAMBOCOR) 50 MG Tab, TAKE 1 TABLET BY MOUTH AS NEEDED FOR AFIB, Disp: , Rfl:     levothyroxine (SYNTHROID) 200 MCG tablet, Take 200 mcg by mouth every morning., Disp: , Rfl:     metFORMIN (GLUCOPHAGE-XR) 500 MG ER 24hr tablet, Take 1,500 mg by mouth every morning., Disp: , Rfl:     metoprolol succinate (TOPROL-XL) 25 MG 24 hr tablet, , Disp: , Rfl: 0    multivitamin capsule, Take 1 capsule by mouth., Disp: , Rfl:     pantoprazole (PROTONIX) 40 MG tablet, Take 40 mg by mouth every morning., Disp: , Rfl:     vitamin D 1000 units Tab, Take 2,000 Units by mouth once daily., Disp: , Rfl:     XARELTO 20 mg Tab, Take 20 mg by mouth every morning., Disp: , Rfl:    Review of patient's allergies indicates:   Allergen Reactions    Diphenhydramine hcl Shortness Of Breath and Rash    Niacin-simvastatin Swelling     Myalgia      Social History     Tobacco Use    Smoking status: Never    Smokeless tobacco: Never   Substance Use Topics    Alcohol use: Yes     Alcohol/week: 1.8 standard drinks of alcohol     Types: 1 Glasses of wine, 1 Standard drinks or equivalent per week      Family History   Problem Relation Age of Onset    Arthritis Mother     Cataracts Mother     Hypertension Father     Heart disease Father     Cataracts Sister     Multiple sclerosis Sister     Breast  cancer Sister 63    Cataracts Brother     Hypertension Brother     Heart disease Brother     Cataracts Brother     Stroke Maternal Grandmother     Diabetes Maternal Grandmother     Cataracts Maternal Grandmother     Cancer Maternal Grandfather         Review of Systems   Integumentary:  Negative for color change, rash, mole/lesion, breast mass, breast discharge and breast tenderness.   Breast: Negative for mass and tenderness       Physical Exam   Constitutional: She is cooperative.   HENT:   Head: Normocephalic.   Pulmonary/Chest: She exhibits no mass. Right breast exhibits no inverted nipple, no mass, no nipple discharge, no skin change and no tenderness. Left breast exhibits no inverted nipple, no mass, no nipple discharge, no skin change and no tenderness.   Abdominal: Normal appearance.   Musculoskeletal: Lymphadenopathy:      Upper Body:      Right upper body: No supraclavicular or axillary adenopathy.      Left upper body: No supraclavicular or axillary adenopathy.     Neurological: She is alert.   Skin: No rash noted.          ASSESSMENT and PLAN OF CARE     1. Malignant neoplasm of lower-inner quadrant of left breast in female, estrogen receptor negative  Assessment & Plan:  She is doing well and will continue to be followed according to NCCN Guidelines. She understands that her exams have remained stable and is comfortable being followed in a conservative fashion. She understands the importance of monthly self-breast examination and knows to report any and all changes as they occur.        2. Estrogen receptor negative status (ER-)  Assessment & Plan:  Same as above      Medical Decision Making: It is my impression that this patient suffers all conditions contained in this medical document.  Each of these conditions did affect our plan of care and my medical decision making today.  It is my opinion that the medical decision making concerning this patient was of moderate difficulty based on the  aforementioned conditions.  Any further recommendations will be communicated to the patient by me.  I have reviewed and verified her allergies, list of medications, medical and surgical histories, social history, and a pertinent review of symptoms.    Follow up:  3 Months and PRN     For:AMAURY (VIOLETTE) at [facility] #1 of 3; pending when she  finishes radiations if too soon PE only

## 2023-12-11 NOTE — ASSESSMENT & PLAN NOTE
She is doing well and will continue to be followed according to NCCN Guidelines. She understands that her exams have remained stable and is comfortable being followed in a conservative fashion. She understands the importance of monthly self-breast examination and knows to report any and all changes as they occur.    She tolerated port removal in the office well.

## 2023-12-28 ENCOUNTER — OFFICE VISIT (OUTPATIENT)
Dept: SURGERY | Facility: CLINIC | Age: 63
End: 2023-12-28
Payer: MEDICARE

## 2023-12-28 DIAGNOSIS — C50.312 MALIGNANT NEOPLASM OF LOWER-INNER QUADRANT OF LEFT BREAST IN FEMALE, ESTROGEN RECEPTOR NEGATIVE: Primary | ICD-10-CM

## 2023-12-28 DIAGNOSIS — Z17.1 MALIGNANT NEOPLASM OF LOWER-INNER QUADRANT OF LEFT BREAST IN FEMALE, ESTROGEN RECEPTOR NEGATIVE: Primary | ICD-10-CM

## 2023-12-28 PROCEDURE — 36590 REMOVAL TUNNELED CV CATH: CPT | Mod: S$PBB,,, | Performed by: SPECIALIST

## 2023-12-28 PROCEDURE — 36590 REMOVAL TUNNELED CV CATH: CPT | Mod: PBBFAC,PN | Performed by: SPECIALIST

## 2023-12-28 PROCEDURE — 36590 PR REMOVAL TUNNELED CV CATH W SUBQ PORT OR PUMP: ICD-10-PCS | Mod: S$PBB,,, | Performed by: SPECIALIST

## 2023-12-28 PROCEDURE — 99999 PR PBB SHADOW E&M-EST. PATIENT-LVL I: ICD-10-PCS | Mod: PBBFAC,,, | Performed by: SPECIALIST

## 2023-12-28 PROCEDURE — 99999 PR PBB SHADOW E&M-EST. PATIENT-LVL I: CPT | Mod: PBBFAC,,, | Performed by: SPECIALIST

## 2023-12-28 PROCEDURE — 99499 NO LOS: ICD-10-PCS | Mod: S$PBB,,, | Performed by: SPECIALIST

## 2023-12-28 PROCEDURE — 99211 OFF/OP EST MAY X REQ PHY/QHP: CPT | Mod: PBBFAC,PN | Performed by: SPECIALIST

## 2023-12-28 PROCEDURE — 99499 UNLISTED E&M SERVICE: CPT | Mod: S$PBB,,, | Performed by: SPECIALIST

## 2024-03-11 NOTE — PROGRESS NOTES
[facility]  Toño Thompson MD, FACS  Yonatan Al NP-CATALINA      Date of Service: 3/18/2024    Chief Complaint:   Karen Carpio is a 64 y.o. female presenting today for her 3 month follow up to her breast cancer diagnosis.  She is due for a Physical Examination.  She reports no interval changes.  She completed adjuvant radiation 2/13/2024     History of Present Illness:   Karen Carpio was diagnosed with Triple Negative left breast cancer and August 2023 at the age of 63. She elected lumpectomy w/ SLN biopsy that showed 1.5 cm Grade III IDC with 1.5 cm DCIS. Negative SLN and Negative margins. She completed adjuvant chemotherapy. She also completed adjuvant radiation.   MD:::Holly Lopez MD; Jero Tate MD; Thiago Holt MD; Jamaal Robbins MD.     Past Medical History:   Diagnosis Date    Acid reflux     Anemia, iron deficiency     Atrial fibrillation     BRCA negative 08/15/2023    Breast lump on left side at 9 o'clock position 07/27/2023    Cataract     Depression     Estrogen receptor negative status (ER-) 12/10/2023    Family history of breast cancer 07/31/2023    GERD (gastroesophageal reflux disease)     Hyperlipidemia     Insulin resistance 09/2022    Dr Drake BS doesn't check 09/29/2022    Insulin resistance     Malignant neoplasm of lower-inner quadrant of left breast in female, estrogen receptor negative 08/04/2023    Pneumonia     Sleep apnea, obstructive     Dr León    Thyroid disease     Dr Drake    Type 2 diabetes mellitus without complication, without long-term current use of insulin 07/13/2023      Past Surgical History:   Procedure Laterality Date    ABDOMINOPLASTY      CATARACT EXTRACTION Bilateral 12/2022    COLONOSCOPY      COLONOSCOPY N/A 05/05/2022    Procedure: COLONOSCOPY;  Surgeon: Akosua Lemos MD;  Location: [facility];  Service: Endoscopy;  Laterality: N/A;    Heart Ablation  06/06/2023    Left NL lumpectomy w/ SLN biopsy and port placement  followed by oncoplastics 8/24/23      MANDIBLE SURGERY      SLEEVE GASTROPLASTY  02/05/2020    TONSILLECTOMY, ADENOIDECTOMY          Current Outpatient Medications:     ARIPiprazole (ABILIFY) 5 MG Tab, Take 5 mg by mouth., Disp: , Rfl:     atorvastatin (LIPITOR) 20 MG tablet, , Disp: , Rfl:     buPROPion (WELLBUTRIN XL) 300 MG 24 hr tablet, Take 1 tablet (300 mg total) by mouth once daily., Disp: 30 tablet, Rfl: 11    cetirizine (ZYRTEC) 10 MG tablet, Take 10 mg by mouth once daily., Disp: , Rfl:     EScitalopram oxalate (LEXAPRO) 20 MG tablet, Take 20 mg by mouth., Disp: , Rfl:     FEROSUL 325 mg (65 mg iron) Tab tablet, TAKE 1 TABLET BY MOUTH EVERY DAY, Disp: 30 tablet, Rfl: 11    flecainide (TAMBOCOR) 50 MG Tab, TAKE 1 TABLET BY MOUTH AS NEEDED FOR AFIB, Disp: , Rfl:     levothyroxine (SYNTHROID) 200 MCG tablet, Take 200 mcg by mouth every morning., Disp: , Rfl:     metFORMIN (GLUCOPHAGE-XR) 500 MG ER 24hr tablet, Take 1,500 mg by mouth every morning., Disp: , Rfl:     metoprolol succinate (TOPROL-XL) 25 MG 24 hr tablet, , Disp: , Rfl: 0    multivitamin capsule, Take 1 capsule by mouth., Disp: , Rfl:     pantoprazole (PROTONIX) 40 MG tablet, Take 40 mg by mouth every morning., Disp: , Rfl:     vitamin D 1000 units Tab, Take 2,000 Units by mouth once daily., Disp: , Rfl:     XARELTO 20 mg Tab, Take 20 mg by mouth every morning., Disp: , Rfl:    Review of patient's allergies indicates:   Allergen Reactions    Diphenhydramine hcl Shortness Of Breath and Rash    Niacin-simvastatin Swelling     Myalgia      Social History     Tobacco Use    Smoking status: Never    Smokeless tobacco: Never   Substance Use Topics    Alcohol use: Yes     Alcohol/week: 1.8 standard drinks of alcohol     Types: 1 Glasses of wine, 1 Standard drinks or equivalent per week      Family History   Problem Relation Age of Onset    Arthritis Mother     Cataracts Mother     Hypertension Father     Heart disease Father     Cataracts Sister      Multiple sclerosis Sister     Breast cancer Sister 63    Cataracts Brother     Hypertension Brother     Heart disease Brother     Cataracts Brother     Stroke Maternal Grandmother     Diabetes Maternal Grandmother     Cataracts Maternal Grandmother     Cancer Maternal Grandfather         Review of Systems   Integumentary:  Negative for color change, rash, mole/lesion, breast mass, breast discharge and breast tenderness.   Breast: Negative for mass and tenderness       Physical Exam   Constitutional: She is cooperative.   HENT:   Head: Normocephalic.   Pulmonary/Chest: She exhibits no mass. Right breast exhibits no inverted nipple, no mass, no nipple discharge, no skin change and no tenderness. Left breast exhibits no inverted nipple, no mass, no nipple discharge, no skin change and no tenderness.   Right- Deferred. Left- Post radiation changes   Abdominal: Normal appearance.   Musculoskeletal: Lymphadenopathy:      Upper Body:      Right upper body: No supraclavicular or axillary adenopathy.      Left upper body: No supraclavicular or axillary adenopathy.     Neurological: She is alert.   Skin: No rash noted.          ASSESSMENT and PLAN OF CARE     1. Malignant neoplasm of lower-inner quadrant of left breast in female, estrogen receptor negative  Assessment & Plan:  She is doing well and will continue to be followed according to NCCN Guidelines. She understands that her exams have remained stable and is comfortable being followed in a conservative fashion. She understands the importance of monthly self-breast examination and knows to report any and all changes as they occur.          2. Estrogen receptor negative status (ER-)  Assessment & Plan:  Same as above      3. Family history of breast cancer  Assessment & Plan:  We discussed her family history and how it could impact her own future risks.  We discussed family vs. genetic history and the importance and implications of each.  All questions answered to her  satisfaction.  She knows that as additional family members are diagnosed - she will need to let us know as this may change follow up and imaging recommendations.    We had a discussion concerning Breast Cancer Risk Reduction and current NCCN Guidelines. She knows that her risk can be lowered slightly with a healthy lifestyle and minimal ETOH use. Being physically active will also help. She should reduce or stay away from OCPs and HRT as possible.         Medical Decision Making: It is my impression that this patient suffers all conditions contained in this medical document.  Each of these conditions did affect our plan of care and my medical decision making today.  It is my opinion that the medical decision making concerning this patient was of moderate difficulty based on the aforementioned conditions.  Any further recommendations will be communicated to the patient by me.  I have reviewed and verified her allergies, list of medications, medical and surgical histories, social history, and a pertinent review of symptoms.    Follow up:  3 Months and PRN     For:AMAURY (VIOLETTE) at [facility] # 1 of 3

## 2024-03-18 ENCOUNTER — OFFICE VISIT (OUTPATIENT)
Dept: SURGERY | Facility: CLINIC | Age: 64
End: 2024-03-18
Payer: MEDICARE

## 2024-03-18 DIAGNOSIS — C50.312 MALIGNANT NEOPLASM OF LOWER-INNER QUADRANT OF LEFT BREAST IN FEMALE, ESTROGEN RECEPTOR NEGATIVE: Primary | ICD-10-CM

## 2024-03-18 DIAGNOSIS — Z80.3 FAMILY HISTORY OF BREAST CANCER: ICD-10-CM

## 2024-03-18 DIAGNOSIS — Z17.1 ESTROGEN RECEPTOR NEGATIVE STATUS (ER-): ICD-10-CM

## 2024-03-18 DIAGNOSIS — Z17.1 MALIGNANT NEOPLASM OF LOWER-INNER QUADRANT OF LEFT BREAST IN FEMALE, ESTROGEN RECEPTOR NEGATIVE: Primary | ICD-10-CM

## 2024-03-18 PROCEDURE — 99999 PR PBB SHADOW E&M-EST. PATIENT-LVL III: CPT | Mod: PBBFAC,,, | Performed by: NURSE PRACTITIONER

## 2024-03-18 PROCEDURE — 99214 OFFICE O/P EST MOD 30 MIN: CPT | Mod: S$PBB,,, | Performed by: NURSE PRACTITIONER

## 2024-03-18 PROCEDURE — 99213 OFFICE O/P EST LOW 20 MIN: CPT | Mod: PBBFAC,PN | Performed by: NURSE PRACTITIONER

## 2024-05-08 ENCOUNTER — HOSPITAL ENCOUNTER (OUTPATIENT)
Dept: RADIOLOGY | Facility: HOSPITAL | Age: 64
Discharge: HOME | End: 2024-05-08
Attending: PEDIATRICS
Payer: MEDICARE

## 2024-05-08 ENCOUNTER — OFFICE VISIT (OUTPATIENT)
Dept: INTERNAL MEDICINE | Facility: CLINIC | Age: 64
End: 2024-05-08
Payer: MEDICARE

## 2024-05-08 ENCOUNTER — PATIENT MESSAGE (OUTPATIENT)
Dept: INTERNAL MEDICINE | Facility: CLINIC | Age: 64
End: 2024-05-08

## 2024-05-08 VITALS
WEIGHT: 230.38 LBS | SYSTOLIC BLOOD PRESSURE: 122 MMHG | BODY MASS INDEX: 38.38 KG/M2 | OXYGEN SATURATION: 99 % | TEMPERATURE: 99 F | RESPIRATION RATE: 17 BRPM | DIASTOLIC BLOOD PRESSURE: 80 MMHG | HEIGHT: 65 IN | HEART RATE: 76 BPM

## 2024-05-08 DIAGNOSIS — M70.72 BURSITIS OF LEFT HIP, UNSPECIFIED BURSA: ICD-10-CM

## 2024-05-08 DIAGNOSIS — E78.5 HYPERLIPIDEMIA, UNSPECIFIED HYPERLIPIDEMIA TYPE: ICD-10-CM

## 2024-05-08 DIAGNOSIS — M70.72 BURSITIS OF LEFT HIP, UNSPECIFIED BURSA: Primary | ICD-10-CM

## 2024-05-08 PROCEDURE — 99214 OFFICE O/P EST MOD 30 MIN: CPT | Mod: S$PBB,,, | Performed by: PEDIATRICS

## 2024-05-08 PROCEDURE — 99999 PR PBB SHADOW E&M-EST. PATIENT-LVL IV: CPT | Mod: PBBFAC,,, | Performed by: PEDIATRICS

## 2024-05-08 PROCEDURE — G2211 COMPLEX E/M VISIT ADD ON: HCPCS | Mod: S$PBB,,, | Performed by: PEDIATRICS

## 2024-05-08 PROCEDURE — 73502 X-RAY EXAM HIP UNI 2-3 VIEWS: CPT | Mod: 26,LT,, | Performed by: RADIOLOGY

## 2024-05-08 PROCEDURE — 73502 X-RAY EXAM HIP UNI 2-3 VIEWS: CPT | Mod: TC,LT

## 2024-05-08 PROCEDURE — 99214 OFFICE O/P EST MOD 30 MIN: CPT | Mod: PBBFAC,25 | Performed by: PEDIATRICS

## 2024-05-08 RX ORDER — METHYLPREDNISOLONE 4 MG/1
TABLET ORAL
Qty: 21 EACH | Refills: 0 | Status: SHIPPED | OUTPATIENT
Start: 2024-05-08 | End: 2024-05-29

## 2024-05-08 RX ORDER — MELOXICAM 15 MG/1
15 TABLET ORAL DAILY
Qty: 14 TABLET | Refills: 0 | Status: SHIPPED | OUTPATIENT
Start: 2024-05-08 | End: 2024-05-22

## 2024-05-08 NOTE — PROGRESS NOTES
Subjective     Patient ID: Karen Carpio is a 64 y.o. female.    Chief Complaint: Hip Pain    Karen Carpio is a 64 year old female here for left hip pain which onset last week. Pt was visiting [place] and doing a lot of walking when pain started. Pt also notes diarrhea since returning from her trip but her friend that was traveling with her does not have any diarrhea. Pt denies dysuria, constipation, blood in stool, or difficulty urinating.      Review of Systems   Constitutional:  Negative for chills and fever.   HENT:  Negative for nasal congestion and rhinorrhea.    Respiratory:  Negative for cough and shortness of breath.    Cardiovascular:  Negative for chest pain.   Gastrointestinal:  Positive for diarrhea. Negative for abdominal pain, blood in stool, change in bowel habit, constipation and nausea.   Endocrine: Negative for cold intolerance, heat intolerance, polydipsia, polyphagia and polyuria.   Genitourinary:  Negative for difficulty urinating and dysuria.   Musculoskeletal:  Positive for arthralgias.   Neurological:  Negative for weakness.          Objective     Physical Exam  Constitutional:       General: She is not in acute distress.     Appearance: Normal appearance. She is obese. She is not ill-appearing or toxic-appearing.   Musculoskeletal:         General: Tenderness present.      Comments: Left greater trochanter mild tenderness and left gracilis muscle mild tenderness with guarding, ROM of lumbar back and hips good   Neurological:      General: No focal deficit present.      Mental Status: She is alert and oriented to person, place, and time. Mental status is at baseline.      Cranial Nerves: No cranial nerve deficit.      Motor: No weakness.      Gait: Gait normal.   Psychiatric:         Mood and Affect: Mood normal.         Behavior: Behavior normal.         Thought Content: Thought content normal.         Judgment: Judgment normal.            Assessment and Plan     1. Bursitis of left  hip, unspecified bursa  -     X-Ray Hip 2 or 3 views Left (with Pelvis when performed); Future; Expected date: 05/08/2024  -     methylPREDNISolone (MEDROL DOSEPACK) 4 mg tablet; use as directed  Dispense: 21 each; Refill: 0  -     meloxicam (MOBIC) 15 MG tablet; Take 1 tablet (15 mg total) by mouth once daily. for 14 days  Dispense: 14 tablet; Refill: 0    2. Hyperlipidemia, unspecified hyperlipidemia type  -     Lipid Panel; Future; Expected date: 05/08/2024  -     Comprehensive Metabolic Panel; Future; Expected date: 05/08/2024        Suspect left hip bursitis. Xray due to hx of breast cancer. Medrol dose pack and meloxicam prescribed. If pt fails to respond, refer to ortho. F/u in 6 months with labs.    Visit today included increased complexity associated with the care of the episodic problem  addressed and managing the longitudinal care of the patient due to the serious and/or complex managed problem(s) .            No follow-ups on file.

## 2024-05-09 ENCOUNTER — PATIENT MESSAGE (OUTPATIENT)
Dept: INTERNAL MEDICINE | Facility: CLINIC | Age: 64
End: 2024-05-09
Payer: MEDICARE

## 2024-06-14 DIAGNOSIS — Z17.1 MALIGNANT NEOPLASM OF LOWER-INNER QUADRANT OF LEFT BREAST IN FEMALE, ESTROGEN RECEPTOR NEGATIVE: Primary | ICD-10-CM

## 2024-06-14 DIAGNOSIS — C50.312 MALIGNANT NEOPLASM OF LOWER-INNER QUADRANT OF LEFT BREAST IN FEMALE, ESTROGEN RECEPTOR NEGATIVE: Primary | ICD-10-CM

## 2024-06-19 ENCOUNTER — OFFICE VISIT (OUTPATIENT)
Dept: SURGERY | Facility: CLINIC | Age: 64
End: 2024-06-19
Payer: MEDICARE

## 2024-06-19 DIAGNOSIS — C50.312 MALIGNANT NEOPLASM OF LOWER-INNER QUADRANT OF LEFT BREAST IN FEMALE, ESTROGEN RECEPTOR NEGATIVE: Primary | ICD-10-CM

## 2024-06-19 DIAGNOSIS — Z17.1 ESTROGEN RECEPTOR NEGATIVE STATUS (ER-): ICD-10-CM

## 2024-06-19 DIAGNOSIS — Z17.1 MALIGNANT NEOPLASM OF LOWER-INNER QUADRANT OF LEFT BREAST IN FEMALE, ESTROGEN RECEPTOR NEGATIVE: Primary | ICD-10-CM

## 2024-06-19 DIAGNOSIS — Z80.3 FAMILY HISTORY OF BREAST CANCER: ICD-10-CM

## 2024-06-19 LAB
ALBUMIN SERPL BCP-MCNC: 3.9 G/DL (ref 3.5–5.2)
ALP SERPL-CCNC: 87 U/L (ref 55–135)
ALT SERPL W/O P-5'-P-CCNC: 21 U/L (ref 10–44)
ANION GAP SERPL CALC-SCNC: 11 MMOL/L (ref 8–16)
AST SERPL-CCNC: 22 U/L (ref 10–40)
BASOPHILS # BLD AUTO: 0.02 K/UL (ref 0–0.2)
BASOPHILS NFR BLD: 0.4 % (ref 0–1.9)
BILIRUB SERPL-MCNC: 0.6 MG/DL (ref 0.1–1)
BUN SERPL-MCNC: 24 MG/DL (ref 8–23)
CALCIUM SERPL-MCNC: 9.4 MG/DL (ref 8.7–10.5)
CEA SERPL-MCNC: 1.7 NG/ML (ref 0–5)
CHLORIDE SERPL-SCNC: 106 MMOL/L (ref 95–110)
CO2 SERPL-SCNC: 23 MMOL/L (ref 23–29)
CREAT SERPL-MCNC: 1 MG/DL (ref 0.5–1.4)
DIFFERENTIAL METHOD BLD: ABNORMAL
EOSINOPHIL # BLD AUTO: 0.1 K/UL (ref 0–0.5)
EOSINOPHIL NFR BLD: 2.5 % (ref 0–8)
ERYTHROCYTE [DISTWIDTH] IN BLOOD BY AUTOMATED COUNT: 13.2 % (ref 11.5–14.5)
EST. GFR  (NO RACE VARIABLE): >60 ML/MIN/1.73 M^2
GLUCOSE SERPL-MCNC: 95 MG/DL (ref 70–110)
HCT VFR BLD AUTO: 35.6 % (ref 37–48.5)
HGB BLD-MCNC: 11.1 G/DL (ref 12–16)
IMM GRANULOCYTES # BLD AUTO: 0.01 K/UL (ref 0–0.04)
IMM GRANULOCYTES NFR BLD AUTO: 0.2 % (ref 0–0.5)
LDH SERPL L TO P-CCNC: 211 U/L (ref 110–260)
LYMPHOCYTES # BLD AUTO: 0.8 K/UL (ref 1–4.8)
LYMPHOCYTES NFR BLD: 16 % (ref 18–48)
MCH RBC QN AUTO: 29.6 PG (ref 27–31)
MCHC RBC AUTO-ENTMCNC: 31.2 G/DL (ref 32–36)
MCV RBC AUTO: 95 FL (ref 82–98)
MONOCYTES # BLD AUTO: 0.4 K/UL (ref 0.3–1)
MONOCYTES NFR BLD: 7.4 % (ref 4–15)
NEUTROPHILS # BLD AUTO: 3.8 K/UL (ref 1.8–7.7)
NEUTROPHILS NFR BLD: 73.5 % (ref 38–73)
NRBC BLD-RTO: 0 /100 WBC
PLATELET # BLD AUTO: 206 K/UL (ref 150–450)
PMV BLD AUTO: 12.1 FL (ref 9.2–12.9)
POTASSIUM SERPL-SCNC: 4.1 MMOL/L (ref 3.5–5.1)
PROT SERPL-MCNC: 7 G/DL (ref 6–8.4)
RBC # BLD AUTO: 3.75 M/UL (ref 4–5.4)
SODIUM SERPL-SCNC: 140 MMOL/L (ref 136–145)
WBC # BLD AUTO: 5.11 K/UL (ref 3.9–12.7)

## 2024-06-19 PROCEDURE — 82378 CARCINOEMBRYONIC ANTIGEN: CPT | Performed by: NURSE PRACTITIONER

## 2024-06-19 PROCEDURE — 85025 COMPLETE CBC W/AUTO DIFF WBC: CPT | Performed by: NURSE PRACTITIONER

## 2024-06-19 PROCEDURE — 99213 OFFICE O/P EST LOW 20 MIN: CPT | Mod: PBBFAC,PN | Performed by: NURSE PRACTITIONER

## 2024-06-19 PROCEDURE — 99999 PR PBB SHADOW E&M-EST. PATIENT-LVL III: CPT | Mod: PBBFAC,,, | Performed by: NURSE PRACTITIONER

## 2024-06-19 PROCEDURE — 83615 LACTATE (LD) (LDH) ENZYME: CPT | Performed by: NURSE PRACTITIONER

## 2024-06-19 PROCEDURE — 80053 COMPREHEN METABOLIC PANEL: CPT | Performed by: NURSE PRACTITIONER

## 2024-06-19 NOTE — PROGRESS NOTES
[facility]  Toño Thompson MD, FACS  KORINA Montalvo      Date of Service: 6/19/2024    Chief Complaint:   Karen Carpio is a 64 y.o. female presenting today for her 3-month follow up due to her breast cancer diagnosis.  She is due for her Mammogram # 1 of 3  She reports no interval changes on her self-breast examination.     History of Present Illness:   Karen Carpio was diagnosed with Triple Negative left breast cancer and August 2023 at the age of 63. She elected lumpectomy w/ SLN biopsy that showed 1.5 cm Grade III IDC with 1.5 cm DCIS. Negative SLN and Negative margins. She completed adjuvant chemotherapy. She also completed adjuvant radiation.   MD:::Holly Lopez MD; Jero Tate MD; Thiago Holt MD; Jamaal Robbins MD.     Past Medical History:   Diagnosis Date    Acid reflux     Anemia, iron deficiency     Atrial fibrillation     BRCA negative 08/15/2023    Breast lump on left side at 9 o'clock position 07/27/2023    Cataract     Depression     Estrogen receptor negative status (ER-) 12/10/2023    Family history of breast cancer 07/31/2023    GERD (gastroesophageal reflux disease)     Hyperlipidemia     Insulin resistance 09/2022    Dr Drake BS doesn't check 09/29/2022    Insulin resistance     Malignant neoplasm of lower-inner quadrant of left breast in female, estrogen receptor negative 08/04/2023    Pneumonia     Sleep apnea, obstructive     Dr León    Thyroid disease     Dr Drake    Type 2 diabetes mellitus without complication, without long-term current use of insulin 07/13/2023      Past Surgical History:   Procedure Laterality Date    ABDOMINOPLASTY      BREAST BIOPSY Left 07/2023    BREAST LUMPECTOMY Left 08/2023    CATARACT EXTRACTION Bilateral 12/2022    COLONOSCOPY      COLONOSCOPY N/A 05/05/2022    Procedure: COLONOSCOPY;  Surgeon: Akosua Lemos MD;  Location: [facility];  Service: Endoscopy;  Laterality: N/A;    Heart Ablation  06/06/2023     Left NL lumpectomy w/ SLN biopsy and port placement followed by oncoplastics 8/24/23      MANDIBLE SURGERY      SLEEVE GASTROPLASTY  02/05/2020    TONSILLECTOMY, ADENOIDECTOMY      TOTAL REDUCTION MAMMOPLASTY Bilateral 2023        Current Outpatient Medications:     ARIPiprazole (ABILIFY) 5 MG Tab, Take 5 mg by mouth., Disp: , Rfl:     atorvastatin (LIPITOR) 20 MG tablet, , Disp: , Rfl:     buPROPion (WELLBUTRIN XL) 300 MG 24 hr tablet, Take 1 tablet (300 mg total) by mouth once daily., Disp: 30 tablet, Rfl: 11    cetirizine (ZYRTEC) 10 MG tablet, Take 10 mg by mouth once daily., Disp: , Rfl:     EScitalopram oxalate (LEXAPRO) 20 MG tablet, Take 20 mg by mouth., Disp: , Rfl:     FEROSUL 325 mg (65 mg iron) Tab tablet, TAKE 1 TABLET BY MOUTH EVERY DAY, Disp: 30 tablet, Rfl: 11    levothyroxine (SYNTHROID) 200 MCG tablet, Take 200 mcg by mouth every morning., Disp: , Rfl:     metoprolol succinate (TOPROL-XL) 25 MG 24 hr tablet, , Disp: , Rfl: 0    multivitamin capsule, Take 1 capsule by mouth., Disp: , Rfl:     pantoprazole (PROTONIX) 40 MG tablet, Take 40 mg by mouth every morning., Disp: , Rfl:     vitamin D 1000 units Tab, Take 2,000 Units by mouth once daily., Disp: , Rfl:     XARELTO 20 mg Tab, Take 20 mg by mouth every morning., Disp: , Rfl:     flecainide (TAMBOCOR) 50 MG Tab, TAKE 1 TABLET BY MOUTH AS NEEDED FOR AFIB, Disp: , Rfl:     metFORMIN (GLUCOPHAGE-XR) 500 MG ER 24hr tablet, Take 1,500 mg by mouth every morning. (Patient not taking: Reported on 5/8/2024), Disp: , Rfl:    Review of patient's allergies indicates:   Allergen Reactions    Diphenhydramine hcl Shortness Of Breath and Rash    Niacin-simvastatin Swelling     Myalgia      Social History     Tobacco Use    Smoking status: Never    Smokeless tobacco: Never   Substance Use Topics    Alcohol use: Yes     Alcohol/week: 1.8 standard drinks of alcohol     Types: 1 Glasses of wine, 1 Standard drinks or equivalent per week      Family History    Problem Relation Name Age of Onset    Arthritis Mother      Cataracts Mother      Hypertension Father      Heart disease Father      Cataracts Sister      Multiple sclerosis Sister      Breast cancer Sister  63    Cataracts Brother      Hypertension Brother      Heart disease Brother      Cataracts Brother      Stroke Maternal Grandmother      Diabetes Maternal Grandmother      Cataracts Maternal Grandmother      Cancer Maternal Grandfather          Review of Systems   Integumentary:  Negative for color change, rash, mole/lesion, breast mass, breast discharge and breast tenderness.   Breast: Negative for mass and tenderness       Physical Exam   Constitutional: She is cooperative.   HENT:   Head: Normocephalic.   Pulmonary/Chest: She exhibits no mass. Right breast exhibits no inverted nipple, no mass, no nipple discharge, no skin change and no tenderness. Left breast exhibits no inverted nipple, no mass, no nipple discharge, no skin change and no tenderness.   Abdominal: Normal appearance.   Musculoskeletal: Lymphadenopathy:      Upper Body:      Right upper body: No supraclavicular or axillary adenopathy.      Left upper body: No supraclavicular or axillary adenopathy.     Neurological: She is alert.   Skin: No rash noted.          MAMMOGRAM REPORT: She has some diffuse fibronodular tissue; there are no spiculated lesions, distortions or suspicious calcifications noted; NEM    ASSESSMENT and PLAN OF CARE     1. Malignant neoplasm of lower-inner quadrant of left breast in female, estrogen receptor negative  Assessment & Plan:  She is doing well and will continue to be followed according to NCCN Guidelines. She understands that her imaging and exams have remained stable and is comfortable being followed in a conservative fashion. She understands the importance of monthly self-breast examination and knows to report any and all changes as they occur.    Labs obtained today: CBC, Chem 12, CEA, LDH, CA 27.29 - after  resulted, these will be compared to her previous values and all abnormal values will be phoned to her for discussion.      Orders:  -     Cancer Antigen 27-29  -     Comprehensive Metabolic Panel  -     Lactate Dehydrogenase  -     CEA  -     CBC Auto Differential    2. Estrogen receptor negative status (ER-)  Assessment & Plan:  Same as above      3. Family history of breast cancer  Assessment & Plan:  We discussed her family history and how it could impact her own future risks.  We discussed family vs. genetic history and the importance and implications of each.  All questions answered to her satisfaction.  She knows that as additional family members are diagnosed - she will need to let us know as this may change follow up and imaging recommendations.    We had a discussion concerning Breast Cancer Risk Reduction and current NCCN Guidelines. She knows that her risk can be lowered slightly with a healthy lifestyle and minimal ETOH use. Being physically active will also help. She should reduce or stay away from OCPs and HRT as possible.         Medical Decision Making: TN - It is my impression that the patient suffers from all the listed conditions.  Each of these conditions did affect my Plan of Care and all medical decisions that were rendered.  The medical decision making was of high difficulty based on her comorbidities and her previous diagnosis of Triple Negative breast cancer, which can lead to an increased recurrence rate and pose a direct threat to her life.  Laboratory evaluations are currently pending but will be reviewed in the next 24 hours. Any further recommendations will be communicated to the patient by me.  I have reviewed and verified her allergies, list of medications, medical and surgical histories, social history, and a pertinent review of symptoms.     Follow up:  3 months and prn  for Physical Exam

## 2024-06-21 LAB — CANCER AG27-29 SERPL-ACNC: 25.5 U/ML

## 2024-07-05 ENCOUNTER — LAB VISIT (OUTPATIENT)
Dept: LAB | Facility: HOSPITAL | Age: 64
End: 2024-07-05
Attending: PEDIATRICS
Payer: MEDICARE

## 2024-07-05 DIAGNOSIS — E78.5 HYPERLIPIDEMIA, UNSPECIFIED HYPERLIPIDEMIA TYPE: ICD-10-CM

## 2024-07-05 LAB
ALBUMIN SERPL BCP-MCNC: 3.6 G/DL (ref 3.5–5.2)
ALP SERPL-CCNC: 87 U/L (ref 55–135)
ALT SERPL W/O P-5'-P-CCNC: 19 U/L (ref 10–44)
ANION GAP SERPL CALC-SCNC: 9 MMOL/L (ref 8–16)
AST SERPL-CCNC: 19 U/L (ref 10–40)
BILIRUB SERPL-MCNC: 0.4 MG/DL (ref 0.1–1)
BUN SERPL-MCNC: 24 MG/DL (ref 8–23)
CALCIUM SERPL-MCNC: 9.3 MG/DL (ref 8.7–10.5)
CHLORIDE SERPL-SCNC: 110 MMOL/L (ref 95–110)
CHOLEST SERPL-MCNC: 166 MG/DL (ref 120–199)
CHOLEST/HDLC SERPL: 2.5 {RATIO} (ref 2–5)
CO2 SERPL-SCNC: 24 MMOL/L (ref 23–29)
CREAT SERPL-MCNC: 1.1 MG/DL (ref 0.5–1.4)
EST. GFR  (NO RACE VARIABLE): 55.4 ML/MIN/1.73 M^2
GLUCOSE SERPL-MCNC: 88 MG/DL (ref 70–110)
HDLC SERPL-MCNC: 67 MG/DL (ref 40–75)
HDLC SERPL: 40.4 % (ref 20–50)
LDLC SERPL CALC-MCNC: 85.6 MG/DL (ref 63–159)
NONHDLC SERPL-MCNC: 99 MG/DL
POTASSIUM SERPL-SCNC: 4.4 MMOL/L (ref 3.5–5.1)
PROT SERPL-MCNC: 6.4 G/DL (ref 6–8.4)
SODIUM SERPL-SCNC: 143 MMOL/L (ref 136–145)
TRIGL SERPL-MCNC: 67 MG/DL (ref 30–150)

## 2024-07-05 PROCEDURE — 80061 LIPID PANEL: CPT | Performed by: PEDIATRICS

## 2024-07-05 PROCEDURE — 36415 COLL VENOUS BLD VENIPUNCTURE: CPT | Performed by: PEDIATRICS

## 2024-07-05 PROCEDURE — 80053 COMPREHEN METABOLIC PANEL: CPT | Performed by: PEDIATRICS

## 2024-07-08 ENCOUNTER — PATIENT MESSAGE (OUTPATIENT)
Dept: INTERNAL MEDICINE | Facility: CLINIC | Age: 64
End: 2024-07-08
Payer: MEDICARE

## 2024-07-08 RX ORDER — FERROUS SULFATE TAB 325 MG (65 MG ELEMENTAL FE) 325 (65 FE) MG
TAB ORAL
Qty: 30 TABLET | Refills: 11 | Status: SHIPPED | OUTPATIENT
Start: 2024-07-08

## 2024-07-15 ENCOUNTER — HOSPITAL ENCOUNTER (OUTPATIENT)
Dept: RADIOLOGY | Facility: HOSPITAL | Age: 64
Discharge: HOME | End: 2024-07-15
Attending: PEDIATRICS
Payer: MEDICARE

## 2024-07-15 ENCOUNTER — OFFICE VISIT (OUTPATIENT)
Dept: INTERNAL MEDICINE | Facility: CLINIC | Age: 64
End: 2024-07-15
Payer: MEDICARE

## 2024-07-15 VITALS
HEART RATE: 67 BPM | OXYGEN SATURATION: 99 % | TEMPERATURE: 98 F | SYSTOLIC BLOOD PRESSURE: 122 MMHG | BODY MASS INDEX: 39.22 KG/M2 | DIASTOLIC BLOOD PRESSURE: 78 MMHG | WEIGHT: 235.44 LBS | RESPIRATION RATE: 18 BRPM | HEIGHT: 65 IN

## 2024-07-15 DIAGNOSIS — E88.819 INSULIN RESISTANCE: ICD-10-CM

## 2024-07-15 DIAGNOSIS — K21.9 GASTROESOPHAGEAL REFLUX DISEASE WITHOUT ESOPHAGITIS: ICD-10-CM

## 2024-07-15 DIAGNOSIS — Z17.1 MALIGNANT NEOPLASM OF LOWER-INNER QUADRANT OF LEFT BREAST IN FEMALE, ESTROGEN RECEPTOR NEGATIVE: ICD-10-CM

## 2024-07-15 DIAGNOSIS — D50.9 IRON DEFICIENCY ANEMIA, UNSPECIFIED IRON DEFICIENCY ANEMIA TYPE: ICD-10-CM

## 2024-07-15 DIAGNOSIS — R19.7 DIARRHEA, UNSPECIFIED TYPE: Primary | ICD-10-CM

## 2024-07-15 DIAGNOSIS — E78.5 HYPERLIPIDEMIA, UNSPECIFIED HYPERLIPIDEMIA TYPE: ICD-10-CM

## 2024-07-15 DIAGNOSIS — E55.9 VITAMIN D DEFICIENCY: ICD-10-CM

## 2024-07-15 DIAGNOSIS — I48.0 PAROXYSMAL ATRIAL FIBRILLATION: ICD-10-CM

## 2024-07-15 DIAGNOSIS — F33.1 MODERATE EPISODE OF RECURRENT MAJOR DEPRESSIVE DISORDER: ICD-10-CM

## 2024-07-15 DIAGNOSIS — C50.312 MALIGNANT NEOPLASM OF LOWER-INNER QUADRANT OF LEFT BREAST IN FEMALE, ESTROGEN RECEPTOR NEGATIVE: ICD-10-CM

## 2024-07-15 DIAGNOSIS — E66.01 SEVERE OBESITY (BMI 35.0-39.9) WITH COMORBIDITY: ICD-10-CM

## 2024-07-15 DIAGNOSIS — R19.7 DIARRHEA, UNSPECIFIED TYPE: ICD-10-CM

## 2024-07-15 PROCEDURE — 99999 PR PBB SHADOW E&M-EST. PATIENT-LVL V: CPT | Mod: PBBFAC,,, | Performed by: PEDIATRICS

## 2024-07-15 PROCEDURE — G2211 COMPLEX E/M VISIT ADD ON: HCPCS | Mod: S$PBB,,, | Performed by: PEDIATRICS

## 2024-07-15 PROCEDURE — 74019 RADEX ABDOMEN 2 VIEWS: CPT | Mod: TC

## 2024-07-15 PROCEDURE — 74019 RADEX ABDOMEN 2 VIEWS: CPT | Mod: 26,,, | Performed by: RADIOLOGY

## 2024-07-15 PROCEDURE — 99215 OFFICE O/P EST HI 40 MIN: CPT | Mod: PBBFAC,25 | Performed by: PEDIATRICS

## 2024-07-15 PROCEDURE — 99214 OFFICE O/P EST MOD 30 MIN: CPT | Mod: S$PBB,,, | Performed by: PEDIATRICS

## 2024-07-15 NOTE — PROGRESS NOTES
Patient ID: Karen Carpio is a 64 y.o. female.    Chief Complaint: Follow-up (labs)    History of Present Illness    CHIEF COMPLAINT:  Patient presents today for follow up.    GASTRIC SLEEVE SURGERY AND DIARRHEA:  She reports ongoing diarrhea that has not resolved after discontinuing metformin. She had hoped stopping metformin would alleviate the problem, but while it helped somewhat, she continues to have uncontrollable episodes, including two during a recent vacation. She acknowledges the ongoing diarrhea may be a consequence of her history of gastric sleeve surgery. She denies acid reflux symptoms and mentions her hiatal hernia has been surgically corrected. She takes pantoprazole. Has hx constipation.     METABOLIC SYNDROME:  She has been diagnosed with metabolic syndrome by Dr. Drake but has not been officially diagnosed with diabetes. Her recent blood sugar was 88 which she considers good. Her cholesterol results were favorable, with LDL and total cholesterol below threshold and HDL exceptionally high. She takes atorvastatin 20 mg for cholesterol management. She expresses frustration with her weight and is interested in GLP-1 agonists like Mounjaro for weight loss, which her niece has had success with. However, it is not covered by her insurance and costs around $1200 per month out of pocket. She is aware of risks of obtaining it through non-FDA approved channels. She denies any recent issues with paroxysmal atrial fibrillation.    THYROID CONDITION:  She is followed by Dr. Drake for her thyroid condition and currently takes levothyroxine 200 mcg.    IRON DEFICIENCY ANEMIA:  She takes iron supplements for management.    PAROXYSMAL ATRIAL FIBRILLATION:  She has a history of paroxysmal atrial fibrillation and underwent an ablation procedure last June. She currently takes metoprolol 25 mg without any issues and denies any recent episodes or complications.    DEPRESSION AND ANXIETY:  She reports these  conditions are well-controlled with current treatment and do not significantly impact her daily life.    LEFT BREAST CANCER:  She reports ongoing management and has been keeping up with her oncology appointments. Her recent mammogram was clear.    BONE DENSITY SCAN:  She is due for a bone density scan typically managed by her gynecologist Dr. Lopez.    PMH, PSH, SH, FH reviewed with patient.    ROS:  General: -fever, -chills, -fatigue, -weight gain, +weight loss  Eyes: -vision changes, -redness, -discharge  ENT: -ear pain, -nasal congestion, -sore throat  Cardiovascular: -chest pain, -palpitations, -lower extremity edema  Respiratory: -cough, -shortness of breath  Gastrointestinal: -abdominal pain, -nausea, -vomiting, +diarrhea, -constipation, -blood in stool  Genitourinary: -dysuria, -hematuria, -frequency  Musculoskeletal: -joint pain, -muscle pain  Skin: -rash, -lesion  Neurological: -headache, -dizziness, -numbness, -tingling  Psychiatric: -anxiety, -depression, -sleep difficulty         Exam:  Physical Exam    General: No acute distress. Well-developed. Well-nourished.  Eyes: EOMI. Sclerae anicteric.  HENT: Normocephalic. Atraumatic. Nares patent. Moist oral mucosa.  Cardiovascular: Regular rate. Regular rhythm. No murmurs. No rubs. No gallops. Normal S1, S2.  Respiratory: Normal respiratory effort. Clear to auscultation bilaterally. No rales. No rhonchi. No wheezing.  Abdomen: Soft. Non-tender. Non-distended. Normoactive bowel sounds.  Musculoskeletal: No  obvious deformity.  Extremities: No lower extremity edema.  Neurological: Alert & oriented x3. No slurred speech. Normal gait.  Psychiatric: Normal mood. Normal affect. Good insight. Good judgment.  Skin: Warm. Dry. No rash.         Assessment/Plan:  Diarrhea, unspecified type  -     CULTURE, STOOL; Future; Expected date: 07/15/2024  -     Stool Exam-Ova,Cysts,Parasites; Future; Expected date: 07/15/2024  -     Giardia / Cryptosporidum, EIA; Future; Expected  date: 07/15/2024  -     CLOSTRIDIUM DIFFICILE; Future; Expected date: 07/15/2024  -     Ambulatory referral/consult to Gastroenterology; Future; Expected date: 07/22/2024  -     X-Ray Abdomen Flat And Erect; Future; Expected date: 07/15/2024    Hyperlipidemia, unspecified hyperlipidemia type  -     Lipid Panel; Future; Expected date: 07/15/2024  -     Comprehensive Metabolic Panel; Future; Expected date: 07/15/2024    Iron deficiency anemia, unspecified iron deficiency anemia type    Insulin resistance    Vitamin D deficiency    Paroxysmal atrial fibrillation    Gastroesophageal reflux disease without esophagitis    Severe obesity (BMI 35.0-39.9) with comorbidity    Moderate episode of recurrent major depressive disorder    Malignant neoplasm of lower-inner quadrant of left breast in female, estrogen receptor negative         Assessment & Plan    E03.8 Other specified hypothyroidism  I48.0 Paroxysmal atrial fibrillation  F41.9 Anxiety disorder, unspecified  K59.1 Functional diarrhea  E88.810 Metabolic syndrome  D50.9 Iron deficiency anemia, unspecified  E78.00 Pure hypercholesterolemia, unspecified  C50.912 Malignant neoplasm of unspecified site of left female breast  E86.0 Dehydration  M81.0 Age-related osteoporosis without current pathological fracture  F32.9 Major depressive disorder, single episode, unspecified  E88.811 Insulin resistance syndrome, Type A  Z13.820 Encounter for screening for osteoporosis  DIARRHEA:  - Diarrhea persists despite discontinuing metformin.  - Ordered stool studies to evaluate for other etiologies.  - Patient to follow gastric sleeve diet to see if it improves diarrhea.  - Stool culture ordered.  - Collect sample during active diarrhea for highest yield.  - Abdominal x-ray ordered to evaluate for constipation with overflow diarrhea.  - Referred to GI for consideration of upper endoscopy to evaluate for bacterial overgrowth.  DEHYDRATION:  - Elevated BUN likely due to dehydration from  fasting labs.  - Patient to drink more water in general and when fasting for labs.  WEIGHT MANAGEMENT:  - Considering GLP-1 agonists for weight loss but insurance coverage is a barrier.  - Discussed risks of using compounded or online sourced GLP-1 agonists, including contamination, mixing errors, and accidental overdose.  LABS:  - Educated on proper fasting for labs - no calories, but water, black coffee, and unsweetened tea are allowed.  DIGITAL MEDICINE PROGRAM:  - Explained digital medicine program for managing hypertension, cholesterol and diabetes.  - Patient does not currently qualify.  MEDICATIONS/SUPPLEMENTS:  - Continued pantoprazole, atorvastatin 20 mg, levothyroxine 200 mcg, metoprolol 25 mg, Abilify, and Wellbutrin.  RSV VACCINATION:  - RSV vaccination administered.  BONE DENSITY SCAN:  - Follow up with Dr. Lopez for bone density scan, which may be due soon.  FOLLOW UP:  - Follow up in 6 months with me or Ms. Donte NP.          Visit today included increased complexity associated with the care of the episodic problem  addressed and managing the longitudinal care of the patient due to the serious and/or complex managed problem(s) .      Follow up in about 6 months (around 1/15/2025).    This note was generated with the assistance of ambient listening technology. Verbal consent was obtained by the patient and accompanying visitor(s) for the recording of patient appointment to facilitate this note. I attest to having reviewed and edited the generated note for accuracy, though some syntax or spelling errors may persist. Please contact the author of this note for any clarification.  Answers submitted by the patient for this visit:  Review of Systems Questionnaire (Submitted on 7/12/2024)  activity change: Yes  unexpected weight change: No  neck pain: No  hearing loss: No  rhinorrhea: No  trouble swallowing: No  eye discharge: No  visual disturbance: Yes  chest tightness: No  wheezing: No  chest pain:  No  palpitations: No  blood in stool: No  constipation: No  vomiting: No  diarrhea: Yes  polydipsia: No  polyuria: No  difficulty urinating: No  hematuria: No  menstrual problem: No  dysuria: No  joint swelling: Yes  arthralgias: Yes  headaches: No  weakness: No  confusion: No  dysphoric mood: No

## 2024-07-17 ENCOUNTER — LAB VISIT (OUTPATIENT)
Dept: LAB | Facility: HOSPITAL | Age: 64
End: 2024-07-17
Payer: MEDICARE

## 2024-07-17 DIAGNOSIS — R19.7 DIARRHEA, UNSPECIFIED TYPE: ICD-10-CM

## 2024-07-17 PROCEDURE — 87209 SMEAR COMPLEX STAIN: CPT | Performed by: PEDIATRICS

## 2024-07-17 PROCEDURE — 87324 CLOSTRIDIUM AG IA: CPT | Performed by: PEDIATRICS

## 2024-07-17 PROCEDURE — 87449 NOS EACH ORGANISM AG IA: CPT | Performed by: PEDIATRICS

## 2024-07-17 PROCEDURE — 87449 NOS EACH ORGANISM AG IA: CPT | Mod: 91 | Performed by: PEDIATRICS

## 2024-07-17 PROCEDURE — 87328 CRYPTOSPORIDIUM AG IA: CPT | Performed by: PEDIATRICS

## 2024-07-17 PROCEDURE — 87427 SHIGA-LIKE TOXIN AG IA: CPT | Performed by: PEDIATRICS

## 2024-07-17 PROCEDURE — 87329 GIARDIA AG IA: CPT | Performed by: PEDIATRICS

## 2024-07-17 PROCEDURE — 87046 STOOL CULTR AEROBIC BACT EA: CPT | Performed by: PEDIATRICS

## 2024-07-17 PROCEDURE — 87045 FECES CULTURE AEROBIC BACT: CPT | Performed by: PEDIATRICS

## 2024-07-18 LAB
C DIFF GDH STL QL: NEGATIVE
C DIFF TOX A+B STL QL IA: NEGATIVE
CRYPTOSP AG STL QL IA: NEGATIVE
G LAMBLIA AG STL QL IA: NEGATIVE

## 2024-07-19 LAB
E COLI SXT1 STL QL IA: NEGATIVE
E COLI SXT2 STL QL IA: NEGATIVE

## 2024-07-20 LAB — BACTERIA STL CULT: NORMAL

## 2024-07-22 LAB — O+P STL MICRO: NORMAL

## 2024-08-13 PROBLEM — F33.1 MODERATE EPISODE OF RECURRENT MAJOR DEPRESSIVE DISORDER: Status: ACTIVE | Noted: 2021-03-15

## 2024-08-13 PROBLEM — K44.9 HIATAL HERNIA: Status: ACTIVE | Noted: 2020-01-26

## 2024-08-13 PROBLEM — F41.1 GAD (GENERALIZED ANXIETY DISORDER): Status: ACTIVE | Noted: 2024-05-06

## 2024-08-13 PROBLEM — Z98.84 S/P BARIATRIC SURGERY: Status: ACTIVE | Noted: 2020-02-14

## 2024-08-13 PROBLEM — C50.312 MALIGNANT NEOPLASM OF LOWER-INNER QUADRANT OF LEFT BREAST IN FEMALE, ESTROGEN RECEPTOR NEGATIVE: Chronic | Status: ACTIVE | Noted: 2023-08-04

## 2024-08-13 PROBLEM — T45.1X5A ANEMIA ASSOCIATED WITH CHEMOTHERAPY: Status: ACTIVE | Noted: 2023-10-16

## 2024-08-13 PROBLEM — Z17.1 MALIGNANT NEOPLASM OF LOWER-INNER QUADRANT OF LEFT BREAST IN FEMALE, ESTROGEN RECEPTOR NEGATIVE: Chronic | Status: ACTIVE | Noted: 2023-08-04

## 2024-08-13 PROBLEM — G47.33 OSA (OBSTRUCTIVE SLEEP APNEA): Status: ACTIVE | Noted: 2018-09-25

## 2024-08-13 PROBLEM — D64.81 ANEMIA ASSOCIATED WITH CHEMOTHERAPY: Status: ACTIVE | Noted: 2023-10-16

## 2024-08-13 PROBLEM — I10 BENIGN ESSENTIAL HYPERTENSION: Status: ACTIVE | Noted: 2018-09-25

## 2024-08-13 PROBLEM — E66.9 OBESITY (BMI 30-39.9): Status: ACTIVE | Noted: 2023-07-13

## 2024-08-13 PROBLEM — F33.41 RECURRENT MAJOR DEPRESSIVE DISORDER, IN PARTIAL REMISSION: Status: ACTIVE | Noted: 2024-05-06

## 2024-08-13 PROBLEM — I48.3 TYPICAL ATRIAL FLUTTER: Status: ACTIVE | Noted: 2023-05-11

## 2024-09-12 DIAGNOSIS — E11.9 TYPE 2 DIABETES MELLITUS WITHOUT COMPLICATION: ICD-10-CM

## 2024-09-18 NOTE — PROGRESS NOTES
[facility]  Toño Thompson MD, FACS  Yonatan Al NP-C      Date of Service: 9/19/2024    Chief Complaint:   Karen Carpio is a 64 y.o. female presenting today for her 3 month follow up to her breast cancer diagnosis.  She is due for a Physical Examination.  She reports no interval changes.     History of Present Illness:   Karen Carpio was diagnosed with Triple Negative left breast cancer and August 2023 at the age of 63. She elected lumpectomy w/ SLN biopsy that showed 1.5 cm Grade III IDC with 1.5 cm DCIS. Negative SLN and Negative margins. She completed adjuvant chemotherapy. She also completed adjuvant radiation.   MD:::Holly Lopez MD; Jero Tate MD; Thiago Holt MD; Jamaal Robbins MD.     Past Medical History:   Diagnosis Date    Acid reflux     Anemia, iron deficiency     Atrial fibrillation     Benign essential hypertension 9/25/2018    BRCA negative 08/15/2023    Breast cancer     Breast lump on left side at 9 o'clock position 07/27/2023    Cataract     Depression     Estrogen receptor negative status (ER-) 12/10/2023    Family history of breast cancer 07/31/2023    GERD (gastroesophageal reflux disease)     Hyperlipidemia     Insulin resistance 09/2022    Dr Drake BS doesn't check 09/29/2022    Malignant neoplasm of lower-inner quadrant of left breast in female, estrogen receptor negative 08/04/2023    Pneumonia     Sleep apnea, obstructive     Dr León    Thyroid disease     Dr Drake    Type 2 diabetes mellitus without complication, without long-term current use of insulin 07/13/2023      Past Surgical History:   Procedure Laterality Date    ABDOMINOPLASTY      BREAST BIOPSY Left 07/2023    BREAST LUMPECTOMY Left 08/2023    CATARACT EXTRACTION Bilateral 12/2022    COLONOSCOPY N/A 05/05/2022    Procedure: COLONOSCOPY;  Surgeon: Akosua Lemos MD;  Location: [facility];  Service: Endoscopy;  Laterality: N/A;    COSMETIC SURGERY  May 2021    Tummy  tuck/reduced arms    Heart Ablation  06/06/2023    Left NL lumpectomy w/ SLN biopsy and port placement followed by oncoplastics 8/24/23      MANDIBLE SURGERY      SLEEVE GASTROPLASTY  02/05/2020    TONSILLECTOMY, ADENOIDECTOMY      TOTAL REDUCTION MAMMOPLASTY Bilateral 2023        Current Outpatient Medications:     ARIPiprazole (ABILIFY) 5 MG Tab, Take 5 mg by mouth., Disp: , Rfl:     atorvastatin (LIPITOR) 20 MG tablet, , Disp: , Rfl:     buPROPion (WELLBUTRIN XL) 300 MG 24 hr tablet, Take 1 tablet (300 mg total) by mouth once daily., Disp: 30 tablet, Rfl: 11    cetirizine (ZYRTEC) 10 MG tablet, Take 10 mg by mouth once daily., Disp: , Rfl:     EScitalopram oxalate (LEXAPRO) 20 MG tablet, Take 20 mg by mouth., Disp: , Rfl:     FEROSUL 325 mg (65 mg iron) Tab tablet, TAKE 1 TABLET BY MOUTH EVERY DAY., Disp: 30 tablet, Rfl: 11    levothyroxine (SYNTHROID) 200 MCG tablet, Take 200 mcg by mouth every morning., Disp: , Rfl:     metoprolol succinate (TOPROL-XL) 25 MG 24 hr tablet, , Disp: , Rfl: 0    multivitamin capsule, Take 1 capsule by mouth., Disp: , Rfl:     pantoprazole (PROTONIX) 40 MG tablet, Take 40 mg by mouth every morning., Disp: , Rfl:     XARELTO 20 mg Tab, Take 20 mg by mouth every morning., Disp: , Rfl:    Review of patient's allergies indicates:   Allergen Reactions    Diphenhydramine Rash and Shortness Of Breath     Rash/Respiratory    Diphenhydramine hcl Shortness Of Breath and Rash    Niacin-simvastatin Swelling     Myalgia    Niacin      Joints aching    Simvastatin      Joints aching      Social History     Tobacco Use    Smoking status: Never    Smokeless tobacco: Never   Substance Use Topics    Alcohol use: Yes     Alcohol/week: 1.0 standard drink of alcohol     Types: 1 Glasses of wine per week      Family History   Problem Relation Name Age of Onset    Arthritis Mother      Cataracts Mother      Hypertension Father Nabor     Heart disease Father Nabor     Cataracts Sister Pratibha Paez      Multiple sclerosis Sister Pratibha Paez     Breast cancer Sister Pratibha Paez 63    Cataracts Brother Og     Hypertension Brother Og     Heart disease Brother Og     Cancer Brother Og     Cataracts Brother      Stroke Maternal Grandmother Aissatou     Diabetes Maternal Grandmother Aissatou     Cataracts Maternal Grandmother Aissatou     Cancer Maternal Grandfather Lopez     Cancer Sister Pratibha     Cancer Brother Hunter         Review of Systems   Integumentary:  Negative for color change, rash, mole/lesion, breast mass, breast discharge and breast tenderness.   Breast: Negative for mass and tenderness       Physical Exam   Constitutional: She is cooperative.   HENT:   Head: Normocephalic.   Pulmonary/Chest: She exhibits no mass. Right breast exhibits no inverted nipple, no mass, no nipple discharge, no skin change and no tenderness. Left breast exhibits no inverted nipple, no mass, no nipple discharge, no skin change and no tenderness.   Abdominal: Normal appearance.   Musculoskeletal: Lymphadenopathy:      Upper Body:      Right upper body: No supraclavicular or axillary adenopathy.      Left upper body: No supraclavicular or axillary adenopathy.     Neurological: She is alert.   Skin: No rash noted.          ASSESSMENT and PLAN OF CARE     1. Malignant neoplasm of lower-inner quadrant of left breast in female, estrogen receptor negative  Assessment & Plan:  She is doing well and will continue to be followed according to NCCN Guidelines. She understands that her exams have remained stable and is comfortable being followed in a conservative fashion. She understands the importance of monthly self-breast examination and knows to report any and all changes as they occur.    Labs obtained today: CBC, Chem 12, CEA, LDH, CA 27.29 - after resulted, these will be compared to her previous values and all abnormal values will be phoned to her for discussion.      Orders:  -     Cancer Antigen 27-29; Future; Expected  date: 09/19/2024  -     Comprehensive Metabolic Panel; Future; Expected date: 09/19/2024  -     Lactate Dehydrogenase; Future; Expected date: 09/19/2024  -     CEA; Future; Expected date: 09/19/2024  -     CBC Auto Differential; Future; Expected date: 09/19/2024    Medical Decision Making: It is my impression that this patient suffers all conditions contained in this medical document.  Each of these conditions did affect our plan of care and my medical decision making today.  It is my opinion that the medical decision making concerning this patient was of moderate difficulty based on the aforementioned conditions.  Any further recommendations will be communicated to the patient by me.  I have reviewed and verified her allergies, list of medications, medical and surgical histories, social history, and a pertinent review of symptoms.    Follow up:  3 Months and PRN     For:Physical Examination, MGM (D) at [facility], and LABS

## 2024-09-19 ENCOUNTER — LAB VISIT (OUTPATIENT)
Dept: LAB | Facility: HOSPITAL | Age: 64
End: 2024-09-19
Attending: NURSE PRACTITIONER
Payer: MEDICARE

## 2024-09-19 ENCOUNTER — OFFICE VISIT (OUTPATIENT)
Dept: SURGERY | Facility: CLINIC | Age: 64
End: 2024-09-19
Payer: MEDICARE

## 2024-09-19 DIAGNOSIS — Z17.1 MALIGNANT NEOPLASM OF LOWER-INNER QUADRANT OF LEFT BREAST IN FEMALE, ESTROGEN RECEPTOR NEGATIVE: Chronic | ICD-10-CM

## 2024-09-19 DIAGNOSIS — Z17.1 MALIGNANT NEOPLASM OF LOWER-INNER QUADRANT OF LEFT BREAST IN FEMALE, ESTROGEN RECEPTOR NEGATIVE: Primary | Chronic | ICD-10-CM

## 2024-09-19 DIAGNOSIS — C50.312 MALIGNANT NEOPLASM OF LOWER-INNER QUADRANT OF LEFT BREAST IN FEMALE, ESTROGEN RECEPTOR NEGATIVE: Chronic | ICD-10-CM

## 2024-09-19 DIAGNOSIS — C50.312 MALIGNANT NEOPLASM OF LOWER-INNER QUADRANT OF LEFT BREAST IN FEMALE, ESTROGEN RECEPTOR NEGATIVE: Primary | Chronic | ICD-10-CM

## 2024-09-19 LAB
ALBUMIN SERPL BCP-MCNC: 3.7 G/DL (ref 3.5–5.2)
ALP SERPL-CCNC: 86 U/L (ref 55–135)
ALT SERPL W/O P-5'-P-CCNC: 13 U/L (ref 10–44)
ANION GAP SERPL CALC-SCNC: 9 MMOL/L (ref 8–16)
AST SERPL-CCNC: 19 U/L (ref 10–40)
BASOPHILS # BLD AUTO: 0.03 K/UL (ref 0–0.2)
BASOPHILS NFR BLD: 0.6 % (ref 0–1.9)
BILIRUB SERPL-MCNC: 0.6 MG/DL (ref 0.1–1)
BUN SERPL-MCNC: 23 MG/DL (ref 8–23)
CALCIUM SERPL-MCNC: 9.5 MG/DL (ref 8.7–10.5)
CEA SERPL-MCNC: 1.7 NG/ML (ref 0–5)
CHLORIDE SERPL-SCNC: 108 MMOL/L (ref 95–110)
CO2 SERPL-SCNC: 23 MMOL/L (ref 23–29)
CREAT SERPL-MCNC: 1.1 MG/DL (ref 0.5–1.4)
DIFFERENTIAL METHOD BLD: ABNORMAL
EOSINOPHIL # BLD AUTO: 0.1 K/UL (ref 0–0.5)
EOSINOPHIL NFR BLD: 2.8 % (ref 0–8)
ERYTHROCYTE [DISTWIDTH] IN BLOOD BY AUTOMATED COUNT: 13.6 % (ref 11.5–14.5)
EST. GFR  (NO RACE VARIABLE): 55.4 ML/MIN/1.73 M^2
GLUCOSE SERPL-MCNC: 83 MG/DL (ref 70–110)
HCT VFR BLD AUTO: 36 % (ref 37–48.5)
HGB BLD-MCNC: 11.3 G/DL (ref 12–16)
IMM GRANULOCYTES # BLD AUTO: 0.02 K/UL (ref 0–0.04)
IMM GRANULOCYTES NFR BLD AUTO: 0.4 % (ref 0–0.5)
LYMPHOCYTES # BLD AUTO: 1 K/UL (ref 1–4.8)
LYMPHOCYTES NFR BLD: 20.2 % (ref 18–48)
MCH RBC QN AUTO: 29 PG (ref 27–31)
MCHC RBC AUTO-ENTMCNC: 31.4 G/DL (ref 32–36)
MCV RBC AUTO: 93 FL (ref 82–98)
MONOCYTES # BLD AUTO: 0.3 K/UL (ref 0.3–1)
MONOCYTES NFR BLD: 6.6 % (ref 4–15)
NEUTROPHILS # BLD AUTO: 3.3 K/UL (ref 1.8–7.7)
NEUTROPHILS NFR BLD: 69.4 % (ref 38–73)
NRBC BLD-RTO: 0 /100 WBC
PLATELET # BLD AUTO: 186 K/UL (ref 150–450)
PMV BLD AUTO: 12 FL (ref 9.2–12.9)
POTASSIUM SERPL-SCNC: 4.1 MMOL/L (ref 3.5–5.1)
PROT SERPL-MCNC: 6.9 G/DL (ref 6–8.4)
RBC # BLD AUTO: 3.89 M/UL (ref 4–5.4)
SODIUM SERPL-SCNC: 140 MMOL/L (ref 136–145)
WBC # BLD AUTO: 4.7 K/UL (ref 3.9–12.7)

## 2024-09-19 PROCEDURE — 99999 PR PBB SHADOW E&M-EST. PATIENT-LVL III: CPT | Mod: PBBFAC,,, | Performed by: NURSE PRACTITIONER

## 2024-09-19 PROCEDURE — 36415 COLL VENOUS BLD VENIPUNCTURE: CPT | Mod: PN | Performed by: NURSE PRACTITIONER

## 2024-09-19 PROCEDURE — 85025 COMPLETE CBC W/AUTO DIFF WBC: CPT | Performed by: NURSE PRACTITIONER

## 2024-09-19 PROCEDURE — 83615 LACTATE (LD) (LDH) ENZYME: CPT | Performed by: NURSE PRACTITIONER

## 2024-09-19 PROCEDURE — 99213 OFFICE O/P EST LOW 20 MIN: CPT | Mod: PBBFAC,PN | Performed by: NURSE PRACTITIONER

## 2024-09-19 PROCEDURE — 82378 CARCINOEMBRYONIC ANTIGEN: CPT | Performed by: NURSE PRACTITIONER

## 2024-09-19 PROCEDURE — 80053 COMPREHEN METABOLIC PANEL: CPT | Performed by: NURSE PRACTITIONER

## 2024-09-20 LAB — LDH SERPL L TO P-CCNC: 180 U/L (ref 110–260)

## 2024-09-23 LAB — CANCER AG27-29 SERPL-ACNC: 17.2 U/ML

## 2024-09-27 ENCOUNTER — OFFICE VISIT (OUTPATIENT)
Dept: GASTROENTEROLOGY | Facility: CLINIC | Age: 64
End: 2024-09-27
Payer: MEDICARE

## 2024-09-27 DIAGNOSIS — K58.0 IRRITABLE BOWEL SYNDROME WITH DIARRHEA: ICD-10-CM

## 2024-09-27 NOTE — PROGRESS NOTES
Clinic Consult:  [facility] Consultation Note    Reason for Consult:  The encounter diagnosis was Irritable bowel syndrome with diarrhea.    PCP: Naif Navarro   [address] [facility] [facility] / [place] [id number]    HPI:  This is a 64 y.o. female here for evaluation of area.    She has chronic diarrhea that has been present for many years.  She has been on metformin for a long time but discontinued this about a year ago.  Her diarrhea did seem to improve some (with decreased episodes of diarrhea) but was still present.  She has had workup with stool studies as well as colonoscopy that were all unrevealing for cause of diarrhea.  She started to Tirzepatide one month ago which has treated her diarrhea.  She is having about 1 formed stool per day at this time.    ROS    Medical History:  has a past medical history of Acid reflux, Anemia, iron deficiency, Atrial fibrillation, Benign essential hypertension (9/25/2018), BRCA negative (08/15/2023), Breast cancer, Breast lump on left side at 9 o'clock position (07/27/2023), Cataract, Depression, Estrogen receptor negative status (ER-) (12/10/2023), Family history of breast cancer (07/31/2023), GERD (gastroesophageal reflux disease), Hyperlipidemia, Insulin resistance (09/2022), Malignant neoplasm of lower-inner quadrant of left breast in female, estrogen receptor negative (08/04/2023), Pneumonia, Sleep apnea, obstructive, Thyroid disease, and Type 2 diabetes mellitus without complication, without long-term current use of insulin (07/13/2023).    Surgical History:  has a past surgical history that includes Mandible surgery; TONSILLECTOMY, ADENOIDECTOMY; Sleeve Gastroplasty (02/05/2020); Colonoscopy (N/A, 05/05/2022); Abdominoplasty; Cataract extraction (Bilateral, 12/2022); Heart Ablation (06/06/2023); Left NL lumpectomy w/ SLN biopsy and port placement followed by oncoplastics 8/24/23; Breast lumpectomy (Left, 08/2023); Total Reduction Mammoplasty (Bilateral,  2023); Breast biopsy (Left, 07/2023); and Cosmetic surgery (May 2021).    Family History: family history includes Arthritis in her mother; Breast cancer (age of onset: 63) in her sister; Cancer in her brother, brother, maternal grandfather, and sister; Cataracts in her brother, brother, maternal grandmother, mother, and sister; Diabetes in her maternal grandmother; Heart disease in her brother and father; Hypertension in her brother and father; Multiple sclerosis in her sister; Stroke in her maternal grandmother..     Social History:  reports that she has never smoked. She has never used smokeless tobacco. She reports current alcohol use of about 1.0 standard drink of alcohol per week. She reports that she does not use drugs.    Allergies: Reviewed    Home Medications:   Current Outpatient Medications on File Prior to Visit   Medication Sig Dispense Refill    ARIPiprazole (ABILIFY) 5 MG Tab Take 5 mg by mouth.      atorvastatin (LIPITOR) 20 MG tablet       buPROPion (WELLBUTRIN XL) 300 MG 24 hr tablet Take 1 tablet (300 mg total) by mouth once daily. 30 tablet 11    cetirizine (ZYRTEC) 10 MG tablet Take 10 mg by mouth once daily.      EScitalopram oxalate (LEXAPRO) 20 MG tablet Take 20 mg by mouth.      FEROSUL 325 mg (65 mg iron) Tab tablet TAKE 1 TABLET BY MOUTH EVERY DAY. 30 tablet 11    levothyroxine (SYNTHROID) 200 MCG tablet Take 200 mcg by mouth every morning.      metoprolol succinate (TOPROL-XL) 25 MG 24 hr tablet   0    multivitamin capsule Take 1 capsule by mouth.      pantoprazole (PROTONIX) 40 MG tablet Take 40 mg by mouth every morning.      XARELTO 20 mg Tab Take 20 mg by mouth every morning.       No current facility-administered medications on file prior to visit.       Physical Exam:  There were no vitals taken for this visit.  There is no height or weight on file to calculate BMI.  Physical Exam    Labs: Pertinent labs reviewed.  CRC Screening:     Assessment:  1. Irritable bowel syndrome with  diarrhea    Treatment is controlling symptoms.     Recommendations:   - continue Tirzepatide as this is also helping with bowels.     Irritable bowel syndrome with diarrhea  -     Ambulatory referral/consult to Gastroenterology    Follow up if symptoms worsen or fail to improve.    Thank you so much for allowing me to participate in the care of JODI Azul

## 2024-11-04 ENCOUNTER — OFFICE VISIT (OUTPATIENT)
Dept: OPHTHALMOLOGY | Facility: CLINIC | Age: 64
End: 2024-11-04
Payer: MEDICARE

## 2024-11-04 ENCOUNTER — PATIENT MESSAGE (OUTPATIENT)
Dept: OPHTHALMOLOGY | Facility: CLINIC | Age: 64
End: 2024-11-04

## 2024-11-04 DIAGNOSIS — H16.9 KERATITIS: ICD-10-CM

## 2024-11-04 DIAGNOSIS — E11.9 DIABETES MELLITUS TYPE 2 WITHOUT RETINOPATHY: Primary | ICD-10-CM

## 2024-11-04 DIAGNOSIS — H52.7 REFRACTIVE ERRORS: ICD-10-CM

## 2024-11-04 DIAGNOSIS — Z96.1 PSEUDOPHAKIA OF BOTH EYES: ICD-10-CM

## 2024-11-04 PROCEDURE — 92015 DETERMINE REFRACTIVE STATE: CPT | Mod: ,,, | Performed by: OPTOMETRIST

## 2024-11-04 PROCEDURE — 99999 PR PBB SHADOW E&M-EST. PATIENT-LVL II: CPT | Mod: PBBFAC,,, | Performed by: OPTOMETRIST

## 2024-11-04 PROCEDURE — 99212 OFFICE O/P EST SF 10 MIN: CPT | Mod: PBBFAC | Performed by: OPTOMETRIST

## 2024-11-04 PROCEDURE — 92014 COMPRE OPH EXAM EST PT 1/>: CPT | Mod: S$PBB,,, | Performed by: OPTOMETRIST

## 2024-11-04 NOTE — PROGRESS NOTES
SUBJECTIVE  Karen Carpio is 64 y.o. female  Uncorrected distance visual acuity was 20/30 -2 in the right eye and 20/25 -2 in the left eye. Uncorrected near visual acuity was J4 in the right eye and J3 in the left eye.   Chief Complaint   Patient presents with    Diabetic Eye Exam    Hypertensive Eye Exam    Eye Exam          HPI    NIDDM exam  Decrease near visual acuity since cataract surgery.  Last eye exam 10/09/2023 TRF.  Update glasses RX.  Lab Results       Component                Value               Date                       HGBA1C                   5.2                 09/13/2024              Last edited by Meghann Galindo MA on 11/4/2024  3:53 PM.         Assessment /Plan :  1. Diabetes mellitus type 2 without retinopathy  No Background Diabetic Retinopathy  Strict BG control, f/u w/ PCP, and annual DFE  Stressed importance of DM control to preserve vision       2. Pseudophakia of both eyes  Well-centered, stable IOL OU. Monitor annually.       3. Refractive errors  Dispense Final Rx for glasses.  RTC 1 year  Discussed above and answered questions.       4.Worksheet given. Discussed Dry Eyes in detail including Artificial Tears, lubricants, and Omega 3 Fish Oils.

## 2024-12-15 DIAGNOSIS — C50.312 MALIGNANT NEOPLASM OF LOWER-INNER QUADRANT OF LEFT BREAST IN FEMALE, ESTROGEN RECEPTOR NEGATIVE: Primary | ICD-10-CM

## 2024-12-15 DIAGNOSIS — Z17.1 MALIGNANT NEOPLASM OF LOWER-INNER QUADRANT OF LEFT BREAST IN FEMALE, ESTROGEN RECEPTOR NEGATIVE: Primary | ICD-10-CM

## 2024-12-23 ENCOUNTER — LAB VISIT (OUTPATIENT)
Dept: LAB | Facility: HOSPITAL | Age: 64
End: 2024-12-23
Attending: NURSE PRACTITIONER
Payer: MEDICARE

## 2024-12-23 ENCOUNTER — OFFICE VISIT (OUTPATIENT)
Dept: SURGERY | Facility: CLINIC | Age: 64
End: 2024-12-23
Payer: MEDICARE

## 2024-12-23 DIAGNOSIS — C50.312 MALIGNANT NEOPLASM OF LOWER-INNER QUADRANT OF LEFT BREAST IN FEMALE, ESTROGEN RECEPTOR NEGATIVE: Chronic | ICD-10-CM

## 2024-12-23 DIAGNOSIS — Z17.1 MALIGNANT NEOPLASM OF LOWER-INNER QUADRANT OF LEFT BREAST IN FEMALE, ESTROGEN RECEPTOR NEGATIVE: Chronic | ICD-10-CM

## 2024-12-23 DIAGNOSIS — Z17.1 MALIGNANT NEOPLASM OF LOWER-INNER QUADRANT OF LEFT BREAST IN FEMALE, ESTROGEN RECEPTOR NEGATIVE: Primary | Chronic | ICD-10-CM

## 2024-12-23 DIAGNOSIS — C50.312 MALIGNANT NEOPLASM OF LOWER-INNER QUADRANT OF LEFT BREAST IN FEMALE, ESTROGEN RECEPTOR NEGATIVE: Primary | Chronic | ICD-10-CM

## 2024-12-23 LAB
ALBUMIN SERPL BCP-MCNC: 3.7 G/DL (ref 3.5–5.2)
ALP SERPL-CCNC: 78 U/L (ref 40–150)
ALT SERPL W/O P-5'-P-CCNC: 13 U/L (ref 10–44)
ANION GAP SERPL CALC-SCNC: 9 MMOL/L (ref 8–16)
AST SERPL-CCNC: 17 U/L (ref 10–40)
BASOPHILS # BLD AUTO: 0.03 K/UL (ref 0–0.2)
BASOPHILS NFR BLD: 0.7 % (ref 0–1.9)
BILIRUB SERPL-MCNC: 0.7 MG/DL (ref 0.1–1)
BUN SERPL-MCNC: 20 MG/DL (ref 8–23)
CALCIUM SERPL-MCNC: 9 MG/DL (ref 8.7–10.5)
CEA SERPL-MCNC: 1.7 NG/ML (ref 0–5)
CHLORIDE SERPL-SCNC: 106 MMOL/L (ref 95–110)
CO2 SERPL-SCNC: 24 MMOL/L (ref 23–29)
CREAT SERPL-MCNC: 1 MG/DL (ref 0.5–1.4)
DIFFERENTIAL METHOD BLD: ABNORMAL
EOSINOPHIL # BLD AUTO: 0.1 K/UL (ref 0–0.5)
EOSINOPHIL NFR BLD: 2.6 % (ref 0–8)
ERYTHROCYTE [DISTWIDTH] IN BLOOD BY AUTOMATED COUNT: 13.6 % (ref 11.5–14.5)
EST. GFR  (NO RACE VARIABLE): >60 ML/MIN/1.73 M^2
GLUCOSE SERPL-MCNC: 80 MG/DL (ref 70–110)
HCT VFR BLD AUTO: 34.9 % (ref 37–48.5)
HGB BLD-MCNC: 10.7 G/DL (ref 12–16)
IMM GRANULOCYTES # BLD AUTO: 0.01 K/UL (ref 0–0.04)
IMM GRANULOCYTES NFR BLD AUTO: 0.2 % (ref 0–0.5)
LDH SERPL L TO P-CCNC: 168 U/L (ref 110–260)
LYMPHOCYTES # BLD AUTO: 0.7 K/UL (ref 1–4.8)
LYMPHOCYTES NFR BLD: 15.8 % (ref 18–48)
MCH RBC QN AUTO: 29.5 PG (ref 27–31)
MCHC RBC AUTO-ENTMCNC: 30.7 G/DL (ref 32–36)
MCV RBC AUTO: 96 FL (ref 82–98)
MONOCYTES # BLD AUTO: 0.3 K/UL (ref 0.3–1)
MONOCYTES NFR BLD: 8 % (ref 4–15)
NEUTROPHILS # BLD AUTO: 3.1 K/UL (ref 1.8–7.7)
NEUTROPHILS NFR BLD: 72.7 % (ref 38–73)
NRBC BLD-RTO: 0 /100 WBC
PLATELET # BLD AUTO: 199 K/UL (ref 150–450)
PMV BLD AUTO: 11.6 FL (ref 9.2–12.9)
POTASSIUM SERPL-SCNC: 4.2 MMOL/L (ref 3.5–5.1)
PROT SERPL-MCNC: 6.6 G/DL (ref 6–8.4)
RBC # BLD AUTO: 3.63 M/UL (ref 4–5.4)
SODIUM SERPL-SCNC: 139 MMOL/L (ref 136–145)
WBC # BLD AUTO: 4.25 K/UL (ref 3.9–12.7)

## 2024-12-23 PROCEDURE — 82378 CARCINOEMBRYONIC ANTIGEN: CPT | Performed by: NURSE PRACTITIONER

## 2024-12-23 PROCEDURE — 83615 LACTATE (LD) (LDH) ENZYME: CPT | Performed by: NURSE PRACTITIONER

## 2024-12-23 PROCEDURE — 85025 COMPLETE CBC W/AUTO DIFF WBC: CPT | Performed by: NURSE PRACTITIONER

## 2024-12-23 PROCEDURE — 99213 OFFICE O/P EST LOW 20 MIN: CPT | Mod: PBBFAC,PN | Performed by: NURSE PRACTITIONER

## 2024-12-23 PROCEDURE — 99214 OFFICE O/P EST MOD 30 MIN: CPT | Mod: S$PBB,,, | Performed by: NURSE PRACTITIONER

## 2024-12-23 PROCEDURE — 99999 PR PBB SHADOW E&M-EST. PATIENT-LVL III: CPT | Mod: PBBFAC,,, | Performed by: NURSE PRACTITIONER

## 2024-12-23 PROCEDURE — 80053 COMPREHEN METABOLIC PANEL: CPT | Performed by: NURSE PRACTITIONER

## 2024-12-23 PROCEDURE — 36415 COLL VENOUS BLD VENIPUNCTURE: CPT | Mod: PN | Performed by: NURSE PRACTITIONER

## 2024-12-23 NOTE — PROGRESS NOTES
[facility]  Toño Thompson MD, FACS  Yonatan Al NP-CATALINA      Date of Service: 12/23/2024    Chief Complaint:   Karen Carpio is a 64 y.o. female presenting today for her 3-month follow up due to her breast cancer diagnosis.  She is due for her Mammogram.  She reports no interval changes on her self-breast examination.     History of Present Illness:   Karen Carpio was diagnosed with Triple Negative left breast cancer and August 2023 at the age of 63. She elected lumpectomy w/ SLN biopsy that showed 1.5 cm Grade III IDC with 1.5 cm DCIS. Negative SLN and Negative margins. She completed adjuvant chemotherapy. She also completed adjuvant radiation.   MD:::Holly Lopez MD; Jero Ttae MD; Thiago Holt MD; Jamaal Robbins MD.     Past Medical History:   Diagnosis Date    Acid reflux     Anemia, iron deficiency     Atrial fibrillation     Benign essential hypertension 9/25/2018    BRCA negative 08/15/2023    Breast cancer     Breast lump on left side at 9 o'clock position 07/27/2023    Cataract     Depression     Estrogen receptor negative status (ER-) 12/10/2023    Family history of breast cancer 07/31/2023    GERD (gastroesophageal reflux disease)     Hyperlipidemia     Insulin resistance 09/2022    Dr Drake BS doesn't check 09/29/2022    Malignant neoplasm of lower-inner quadrant of left breast in female, estrogen receptor negative 08/04/2023    Pneumonia     Sleep apnea, obstructive     Dr León    Thyroid disease     Dr Drake    Type 2 diabetes mellitus without complication, without long-term current use of insulin 07/13/2023      Past Surgical History:   Procedure Laterality Date    ABDOMINOPLASTY      BREAST BIOPSY Left 07/2023    BREAST LUMPECTOMY Left 08/2023    CATARACT EXTRACTION Bilateral 12/2022    COLONOSCOPY N/A 05/05/2022    Procedure: COLONOSCOPY;  Surgeon: Akosua Lemos MD;  Location: [facility];  Service: Endoscopy;  Laterality: N/A;    COSMETIC  SURGERY  May 2021    Tummy tuck/reduced arms    Heart Ablation  06/06/2023    Left NL lumpectomy w/ SLN biopsy and port placement followed by oncoplastics 8/24/23      MANDIBLE SURGERY      SLEEVE GASTROPLASTY  02/05/2020    TONSILLECTOMY, ADENOIDECTOMY      TOTAL REDUCTION MAMMOPLASTY Bilateral 2023        Current Outpatient Medications:     ARIPiprazole (ABILIFY) 5 MG Tab, Take 5 mg by mouth., Disp: , Rfl:     atorvastatin (LIPITOR) 20 MG tablet, , Disp: , Rfl:     buPROPion (WELLBUTRIN XL) 300 MG 24 hr tablet, Take 1 tablet (300 mg total) by mouth once daily., Disp: 30 tablet, Rfl: 11    cetirizine (ZYRTEC) 10 MG tablet, Take 10 mg by mouth once daily., Disp: , Rfl:     EScitalopram oxalate (LEXAPRO) 20 MG tablet, Take 20 mg by mouth., Disp: , Rfl:     FEROSUL 325 mg (65 mg iron) Tab tablet, TAKE 1 TABLET BY MOUTH EVERY DAY., Disp: 30 tablet, Rfl: 11    levothyroxine (SYNTHROID) 200 MCG tablet, Take 200 mcg by mouth every morning., Disp: , Rfl:     metoprolol succinate (TOPROL-XL) 25 MG 24 hr tablet, , Disp: , Rfl: 0    multivitamin capsule, Take 1 capsule by mouth., Disp: , Rfl:     pantoprazole (PROTONIX) 40 MG tablet, Take 40 mg by mouth every morning., Disp: , Rfl:     XARELTO 20 mg Tab, Take 20 mg by mouth every morning., Disp: , Rfl:    Review of patient's allergies indicates:   Allergen Reactions    Diphenhydramine Rash and Shortness Of Breath     Rash/Respiratory    Diphenhydramine hcl Shortness Of Breath and Rash    Niacin-simvastatin Swelling     Myalgia    Niacin      Joints aching    Simvastatin      Joints aching      Social History     Tobacco Use    Smoking status: Never    Smokeless tobacco: Never   Substance Use Topics    Alcohol use: Yes     Alcohol/week: 1.0 standard drink of alcohol     Types: 1 Glasses of wine per week      Family History   Problem Relation Name Age of Onset    Arthritis Mother      Cataracts Mother      Hypertension Father Nabor     Heart disease Father Nabor      Cataracts Sister Pratibha Paez     Multiple sclerosis Sister Pratibha Paez     Breast cancer Sister Pratibha Paez 63    Cataracts Brother Og     Hypertension Brother Og     Heart disease Brother Og     Cancer Brother Og     Cataracts Brother      Stroke Maternal Grandmother Aissatou     Diabetes Maternal Grandmother Aissatou     Cataracts Maternal Grandmother Aissatou     Cancer Maternal Grandfather Lopez     Cancer Sister Pratibha     Cancer Brother Hunter         Review of Systems   Integumentary:  Negative for color change, rash, mole/lesion, breast mass, breast discharge and breast tenderness.   Breast: Negative for mass and tenderness       Physical Exam   Constitutional: She is cooperative.   HENT:   Head: Normocephalic.   Pulmonary/Chest: She exhibits no mass. Right breast exhibits no inverted nipple, no mass, no nipple discharge, no skin change and no tenderness. Left breast exhibits no inverted nipple, no mass, no nipple discharge, no skin change and no tenderness.   Abdominal: Normal appearance.   Musculoskeletal: Lymphadenopathy:      Upper Body:      Right upper body: No supraclavicular or axillary adenopathy.      Left upper body: No supraclavicular or axillary adenopathy.     Neurological: She is alert.   Skin: No rash noted.          MAMMOGRAM REPORT: She has some diffuse fibronodular tissue; there are no spiculated lesions, distortions or suspicious calcifications noted; NEM    ASSESSMENT and PLAN OF CARE     1. Malignant neoplasm of lower-inner quadrant of left breast in female, estrogen receptor negative  Assessment & Plan:  She is doing well and will continue to be followed according to NCCN Guidelines. She understands that her imaging and exams have remained stable and is comfortable being followed in a conservative fashion. She understands the importance of monthly self-breast examination and knows to report any and all changes as they occur.    Labs obtained today: CBC, Chem 12, CEA, LDH, CA  27.29 - after resulted, these will be compared to her previous values and all abnormal values will be phoned to her for discussion.      Orders:  -     Cancer Antigen 27-29; Future; Expected date: 12/23/2024  -     Comprehensive Metabolic Panel; Future; Expected date: 12/23/2024  -     Lactate Dehydrogenase; Future; Expected date: 12/23/2024  -     CEA; Future; Expected date: 12/23/2024  -     CBC Auto Differential; Future; Expected date: 12/23/2024    Medical Decision Making: TN - It is my impression that the patient suffers from all the listed conditions.  Each of these conditions did affect my Plan of Care and all medical decisions that were rendered.  The medical decision making was of high difficulty based on her comorbidities and her previous diagnosis of Triple Negative breast cancer, which can lead to an increased recurrence rate and pose a direct threat to her life.  Laboratory evaluations are currently pending but will be reviewed in the next 24 hours. Any further recommendations will be communicated to the patient by me.  I have reviewed and verified her allergies, list of medications, medical and surgical histories, social history, and a pertinent review of symptoms.     Follow up:  3 months and prn  for Physical Exam

## 2024-12-26 LAB — CANCER AG27-29 SERPL-ACNC: 21 U/ML

## 2024-12-27 ENCOUNTER — PATIENT MESSAGE (OUTPATIENT)
Dept: OPTOMETRY | Facility: CLINIC | Age: 64
End: 2024-12-27
Payer: MEDICARE

## 2025-01-07 ENCOUNTER — PATIENT OUTREACH (OUTPATIENT)
Dept: ADMINISTRATIVE | Facility: HOSPITAL | Age: 65
End: 2025-01-07
Payer: MEDICARE

## 2025-01-08 ENCOUNTER — TELEPHONE (OUTPATIENT)
Dept: INTERNAL MEDICINE | Facility: CLINIC | Age: 65
End: 2025-01-08
Payer: MEDICARE

## 2025-01-08 ENCOUNTER — LAB VISIT (OUTPATIENT)
Dept: LAB | Facility: HOSPITAL | Age: 65
End: 2025-01-08
Attending: PEDIATRICS
Payer: MEDICARE

## 2025-01-08 DIAGNOSIS — E11.9 TYPE 2 DIABETES MELLITUS WITHOUT COMPLICATION: ICD-10-CM

## 2025-01-08 DIAGNOSIS — E78.5 HYPERLIPIDEMIA, UNSPECIFIED HYPERLIPIDEMIA TYPE: ICD-10-CM

## 2025-01-08 LAB
ALBUMIN SERPL BCP-MCNC: 3.7 G/DL (ref 3.5–5.2)
ALBUMIN/CREAT UR: 12.6 UG/MG (ref 0–30)
ALP SERPL-CCNC: 73 U/L (ref 40–150)
ALT SERPL W/O P-5'-P-CCNC: 12 U/L (ref 10–44)
ANION GAP SERPL CALC-SCNC: 9 MMOL/L (ref 8–16)
AST SERPL-CCNC: 16 U/L (ref 10–40)
BILIRUB SERPL-MCNC: 0.5 MG/DL (ref 0.1–1)
BUN SERPL-MCNC: 21 MG/DL (ref 8–23)
CALCIUM SERPL-MCNC: 9.3 MG/DL (ref 8.7–10.5)
CHLORIDE SERPL-SCNC: 110 MMOL/L (ref 95–110)
CHOLEST SERPL-MCNC: 157 MG/DL (ref 120–199)
CHOLEST/HDLC SERPL: 2.2 {RATIO} (ref 2–5)
CO2 SERPL-SCNC: 25 MMOL/L (ref 23–29)
CREAT SERPL-MCNC: 0.8 MG/DL (ref 0.5–1.4)
CREAT UR-MCNC: 214 MG/DL (ref 15–325)
EST. GFR  (NO RACE VARIABLE): >60 ML/MIN/1.73 M^2
GLUCOSE SERPL-MCNC: 79 MG/DL (ref 70–110)
HDLC SERPL-MCNC: 71 MG/DL (ref 40–75)
HDLC SERPL: 45.2 % (ref 20–50)
LDLC SERPL CALC-MCNC: 75.8 MG/DL (ref 63–159)
MICROALBUMIN UR DL<=1MG/L-MCNC: 27 UG/ML
NONHDLC SERPL-MCNC: 86 MG/DL
POTASSIUM SERPL-SCNC: 3.8 MMOL/L (ref 3.5–5.1)
PROT SERPL-MCNC: 6.8 G/DL (ref 6–8.4)
SODIUM SERPL-SCNC: 144 MMOL/L (ref 136–145)
TRIGL SERPL-MCNC: 51 MG/DL (ref 30–150)

## 2025-01-08 PROCEDURE — 36415 COLL VENOUS BLD VENIPUNCTURE: CPT | Performed by: PEDIATRICS

## 2025-01-08 PROCEDURE — 80061 LIPID PANEL: CPT | Performed by: PEDIATRICS

## 2025-01-08 PROCEDURE — 82043 UR ALBUMIN QUANTITATIVE: CPT | Performed by: PEDIATRICS

## 2025-01-08 PROCEDURE — 80053 COMPREHEN METABOLIC PANEL: CPT | Performed by: PEDIATRICS

## 2025-01-08 NOTE — TELEPHONE ENCOUNTER
----- Message from Naif Navarro MD sent at 1/8/2025  3:59 PM CST -----  Here are your labs, please make a follow up appointment with me or Mrs Kent.

## 2025-01-13 ENCOUNTER — OFFICE VISIT (OUTPATIENT)
Dept: INTERNAL MEDICINE | Facility: CLINIC | Age: 65
End: 2025-01-13
Payer: MEDICARE

## 2025-01-13 VITALS
HEART RATE: 80 BPM | TEMPERATURE: 97 F | SYSTOLIC BLOOD PRESSURE: 126 MMHG | DIASTOLIC BLOOD PRESSURE: 80 MMHG | WEIGHT: 212.31 LBS | OXYGEN SATURATION: 96 % | BODY MASS INDEX: 35.37 KG/M2 | HEIGHT: 65 IN

## 2025-01-13 DIAGNOSIS — Z17.1 MALIGNANT NEOPLASM OF LOWER-INNER QUADRANT OF LEFT BREAST IN FEMALE, ESTROGEN RECEPTOR NEGATIVE: ICD-10-CM

## 2025-01-13 DIAGNOSIS — E78.2 MIXED HYPERLIPIDEMIA: ICD-10-CM

## 2025-01-13 DIAGNOSIS — K21.9 GASTROESOPHAGEAL REFLUX DISEASE WITHOUT ESOPHAGITIS: ICD-10-CM

## 2025-01-13 DIAGNOSIS — F33.41 RECURRENT MAJOR DEPRESSIVE DISORDER, IN PARTIAL REMISSION: ICD-10-CM

## 2025-01-13 DIAGNOSIS — E66.01 SEVERE OBESITY (BMI 35.0-39.9) WITH COMORBIDITY: ICD-10-CM

## 2025-01-13 DIAGNOSIS — I48.3 TYPICAL ATRIAL FLUTTER: ICD-10-CM

## 2025-01-13 DIAGNOSIS — I10 BENIGN ESSENTIAL HYPERTENSION: ICD-10-CM

## 2025-01-13 DIAGNOSIS — Z00.00 WELL ADULT EXAM: Primary | ICD-10-CM

## 2025-01-13 DIAGNOSIS — E55.9 VITAMIN D DEFICIENCY: ICD-10-CM

## 2025-01-13 DIAGNOSIS — F33.1 MODERATE EPISODE OF RECURRENT MAJOR DEPRESSIVE DISORDER: ICD-10-CM

## 2025-01-13 DIAGNOSIS — C50.312 MALIGNANT NEOPLASM OF LOWER-INNER QUADRANT OF LEFT BREAST IN FEMALE, ESTROGEN RECEPTOR NEGATIVE: ICD-10-CM

## 2025-01-13 DIAGNOSIS — F41.1 GAD (GENERALIZED ANXIETY DISORDER): ICD-10-CM

## 2025-01-13 DIAGNOSIS — Z98.84 S/P BARIATRIC SURGERY: ICD-10-CM

## 2025-01-13 DIAGNOSIS — E03.4 HYPOTHYROIDISM DUE TO ACQUIRED ATROPHY OF THYROID: ICD-10-CM

## 2025-01-13 DIAGNOSIS — E88.819 INSULIN RESISTANCE: ICD-10-CM

## 2025-01-13 DIAGNOSIS — G47.33 OSA (OBSTRUCTIVE SLEEP APNEA): ICD-10-CM

## 2025-01-13 PROCEDURE — 99999 PR PBB SHADOW E&M-EST. PATIENT-LVL IV: CPT | Mod: PBBFAC,,, | Performed by: PEDIATRICS

## 2025-01-13 PROCEDURE — 99214 OFFICE O/P EST MOD 30 MIN: CPT | Mod: PBBFAC | Performed by: PEDIATRICS

## 2025-01-13 PROCEDURE — 99397 PER PM REEVAL EST PAT 65+ YR: CPT | Mod: GZ,S$PBB,, | Performed by: PEDIATRICS

## 2025-01-13 NOTE — PROGRESS NOTES
Patient ID: Karen Carpio is a 64 y.o. female.    Chief Complaint: Follow-up    History of Present Illness    CHIEF COMPLAINT:  Patient presents today for yearly follow-up and management of multiple conditions including thyroid, AFib, and breast cancer.    MEDICAL MANAGEMENT:  She was seen by Dr. Drake in September for thyroid management, Dr. Flores in October for atrial fibrillation management, and Dr. Tate for breast cancer management, with no changes made to current treatment regimens.    WEIGHT MANAGEMENT:  She has lost approximately 20 lbs over the past three months on Mounjaro, reporting no side effects and decreased food preoccupation. She has a history of bariatric surgery with previously experienced diarrhea that has improved. She expresses desire to improve functional capacity, specifically to walk up stairs without shortness of breath.    MENTAL HEALTH:  She reports generally well-controlled depression symptoms with occasional mood fluctuations, noting feeling down this week possibly due to weather changes.    SLEEP:  She requires nightly CPAP use for obstructive sleep apnea and reports inability to sleep without the device.    GASTROINTESTINAL:  Her acid reflux is well-controlled with Pentaprazole.    BLOOD PRESSURE:  She reports good blood pressure control.    PMH, PSH, SH, FH reviewed with patient.      ROS:  General: -fever, -chills, -fatigue, -weight gain, +weight loss  Eyes: -vision changes, -redness, -discharge  ENT: -ear pain, -nasal congestion, -sore throat  Cardiovascular: -chest pain, -palpitations, -lower extremity edema  Respiratory: -cough, +shortness of breath  Gastrointestinal: -abdominal pain, -nausea, -vomiting, -diarrhea, -constipation, -blood in stool, -heartburn  Genitourinary: -dysuria, -hematuria, -frequency  Musculoskeletal: -joint pain, -muscle pain  Skin: -rash, -lesion  Neurological: -headache, -dizziness, -numbness, -tingling  Psychiatric: -anxiety, +depression, +sleep  difficulty         Exam:  Physical Exam    General: No acute distress. Well-developed. Well-nourished.  Eyes: EOMI. Sclerae anicteric.  HENT: Normocephalic. Atraumatic. Nares patent. Moist oral mucosa.  Cardiovascular: Regular rate. Regular rhythm. No murmurs. No rubs. No gallops. Normal S1, S2.  Respiratory: Normal respiratory effort. Clear to auscultation bilaterally. No rales. No rhonchi. No wheezing.  Abdomen: Soft. Non-tender. Non-distended. Normoactive bowel sounds.  Musculoskeletal: No  obvious deformity.  Extremities: No lower extremity edema.  Neurological: Alert & oriented x3. No slurred speech. Normal gait.  Psychiatric: Normal mood. Normal affect. Good insight. Good judgment.  Skin: Warm. Dry. No rash.  Diabetic Foot: Normal diabetic foot exam.  Pulses 2+, Foot hygiene was good, no ulcers, no onchomycosis, no tinea, monofilament intact.         Assessment/Plan:  Well adult exam    Severe obesity (BMI 35.0-39.9) with comorbidity    Malignant neoplasm of lower-inner quadrant of left breast in female, estrogen receptor negative    Moderate episode of recurrent major depressive disorder    Typical atrial flutter    Vitamin D deficiency    Mixed hyperlipidemia  -     Lipid Panel; Future; Expected date: 01/13/2025  -     Comprehensive Metabolic Panel; Future; Expected date: 01/13/2025    TAL (obstructive sleep apnea)    Recurrent major depressive disorder, in partial remission    S/P bariatric surgery    Hypothyroidism due to acquired atrophy of thyroid    Insulin resistance    MARGARITA (generalized anxiety disorder)    Gastroesophageal reflux disease without esophagitis    Benign essential hypertension         Assessment & Plan    IMPRESSION:  - Reviewed patient's follow-ups with multiple specialists (Dr. Drake, Dr. Flores, Dr. Tate) - all reported satisfaction with current management  - Noted ~20 lb weight loss in ~3 months on Mounjaro, consistent with expected results  - Assessed side effects of Mounjaro, found  primary effect of reduced appetite aligns with intended mechanism  - Evaluated resolution of previous diarrhea issues post-bariatric surgery, likely due to Mounjaro's constipating effect  - Considered long-term weight management strategies post-Mounjaro, based on observed patterns in other patients  - Reviewed lab results: electrolytes, blood sugar, kidney and liver function all within normal range  - Noted improvement in previously mild CKD (stage 3), now calculating as normal  - Assessed cholesterol results as excellent, attributed to lifestyle changes  - Deferred thyroid and A1C testing to Dr. Drake to avoid redundancy  - Performed diabetic foot exam due to insulin resistance, despite patient not being technically diabetic    MALIGNANT NEOPLASM OF LOWER-INNER QUADRANT OF LEFT FEMALE BREAST:  - Follow-up with Dr. Tate for breast cancer conducted.  - Dr. Tate was satisfied with the patient's condition and did not recommend any changes.    SEVERE OBESITY (BMI 35.0-39.9) WITH COMORBIDITY:  - Continued Mounjaro for weight management.  - Explained Mounjaro's mechanism of action: lowering insulin resistance, improving insulin metabolism, slowing gastric emptying, and affecting appetite through stomach-brain-pancreas feedback.  - Discussed potential long-term outcomes, including possibility of maintaining weight loss through lifestyle changes or need for periodic use.  - Clarified the medical significance of visceral fat versus subcutaneous fat.    MODERATE EPISODE OF RECURRENT MAJOR DEPRESSIVE DISORDER:  - Noted occasional mood fluctuations, but not severe.  - Assessed that depression symptoms are generally well-controlled.  - Encouraged continued engagement in physical activity to manage mood.    TYPICAL ATRIAL FLUTTER:  - Follow-up with Dr. Flores for atrial fibrillation conducted in October.  - Dr. Flores was satisfied with the patient's condition and did not recommend any changes.    DIABETES:  - Performed diabetic  foot exam.  - Blood glucose levels and A1C results from September check by Dr. Drake are within normal range.  - Advised patient to continue eating proteins first during meals to promote fullness.    BARIATRIC SURGERY:  - Noted previous experience of diarrhea after surgery, which has improved.  - This improvement is likely due to Mounjaro's side effect of constipation.    ACID REFLUX:  - Continued pantoprazole for well-controlled acid reflux management.    OBSTRUCTIVE SLEEP APNEA:  - Confirmed nightly use of CPAP for sleep apnea management.    VITAMIN D DEFICIENCY:  - Follow-up with Dr. Drake in September showed satisfactory vitamin D levels.  - Continued vitamin D supplementation with no changes recommended.    HYPERTENSION:  - Blood pressure is well-controlled.    THYROID DISORDER:  - Follow-up with Dr. Drake in September showed satisfactory thyroid function.  - No changes recommended.    COVID-19 VACCINATION:  - Informed about updated COVID vaccine recommendations, now annual like flu vaccine.    FOLLOW UP:  - Follow up in 6 months.          Visit today included increased complexity associated with the care of the episodic problem  addressed and managing the longitudinal care of the patient due to the serious and/or complex managed problem(s) .      Follow up in about 6 months (around 7/13/2025).    This note was generated with the assistance of ambient listening technology. Verbal consent was obtained by the patient and accompanying visitor(s) for the recording of patient appointment to facilitate this note. I attest to having reviewed and edited the generated note for accuracy, though some syntax or spelling errors may persist. Please contact the author of this note for any clarification.

## 2025-02-21 DIAGNOSIS — Z00.00 ENCOUNTER FOR MEDICARE ANNUAL WELLNESS EXAM: ICD-10-CM

## 2025-05-26 ENCOUNTER — OFFICE VISIT (OUTPATIENT)
Dept: INTERNAL MEDICINE | Facility: CLINIC | Age: 65
End: 2025-05-26
Payer: MEDICARE

## 2025-05-26 VITALS
HEART RATE: 82 BPM | SYSTOLIC BLOOD PRESSURE: 136 MMHG | OXYGEN SATURATION: 99 % | WEIGHT: 199.75 LBS | DIASTOLIC BLOOD PRESSURE: 82 MMHG | BODY MASS INDEX: 33.28 KG/M2 | HEIGHT: 65 IN | TEMPERATURE: 99 F

## 2025-05-26 DIAGNOSIS — D50.8 IRON DEFICIENCY ANEMIA SECONDARY TO INADEQUATE DIETARY IRON INTAKE: ICD-10-CM

## 2025-05-26 DIAGNOSIS — E78.2 MIXED HYPERLIPIDEMIA: ICD-10-CM

## 2025-05-26 DIAGNOSIS — J40 SINOBRONCHITIS: Primary | ICD-10-CM

## 2025-05-26 DIAGNOSIS — Z17.1 MALIGNANT NEOPLASM OF LOWER-INNER QUADRANT OF LEFT BREAST IN FEMALE, ESTROGEN RECEPTOR NEGATIVE: Chronic | ICD-10-CM

## 2025-05-26 DIAGNOSIS — C50.312 MALIGNANT NEOPLASM OF LOWER-INNER QUADRANT OF LEFT BREAST IN FEMALE, ESTROGEN RECEPTOR NEGATIVE: Chronic | ICD-10-CM

## 2025-05-26 DIAGNOSIS — F33.1 MODERATE EPISODE OF RECURRENT MAJOR DEPRESSIVE DISORDER: ICD-10-CM

## 2025-05-26 DIAGNOSIS — E03.4 HYPOTHYROIDISM DUE TO ACQUIRED ATROPHY OF THYROID: ICD-10-CM

## 2025-05-26 DIAGNOSIS — E66.9 OBESITY (BMI 30-39.9): ICD-10-CM

## 2025-05-26 DIAGNOSIS — R05.9 COUGH, UNSPECIFIED TYPE: ICD-10-CM

## 2025-05-26 DIAGNOSIS — J32.9 SINOBRONCHITIS: Primary | ICD-10-CM

## 2025-05-26 DIAGNOSIS — I48.3 TYPICAL ATRIAL FLUTTER: ICD-10-CM

## 2025-05-26 DIAGNOSIS — Z80.3 FAMILY HISTORY OF BREAST CANCER: ICD-10-CM

## 2025-05-26 DIAGNOSIS — F41.1 GAD (GENERALIZED ANXIETY DISORDER): ICD-10-CM

## 2025-05-26 LAB
CTP QC/QA: YES
CTP QC/QA: YES
POC MOLECULAR INFLUENZA A AGN: NEGATIVE
POC MOLECULAR INFLUENZA B AGN: NEGATIVE
SARS-COV-2 RDRP RESP QL NAA+PROBE: NEGATIVE

## 2025-05-26 PROCEDURE — 87502 INFLUENZA DNA AMP PROBE: CPT | Mod: PBBFAC | Performed by: INTERNAL MEDICINE

## 2025-05-26 PROCEDURE — 99999 PR PBB SHADOW E&M-EST. PATIENT-LVL III: CPT | Mod: PBBFAC,,, | Performed by: INTERNAL MEDICINE

## 2025-05-26 PROCEDURE — 99999PBSHW: Mod: PBBFAC,,,

## 2025-05-26 PROCEDURE — 99213 OFFICE O/P EST LOW 20 MIN: CPT | Mod: PBBFAC | Performed by: INTERNAL MEDICINE

## 2025-05-26 PROCEDURE — 99999PBSHW POCT INFLUENZA A/B MOLECULAR: Mod: PBBFAC,,,

## 2025-05-26 PROCEDURE — 87635 SARS-COV-2 COVID-19 AMP PRB: CPT | Mod: PBBFAC | Performed by: INTERNAL MEDICINE

## 2025-05-26 RX ORDER — FLUTICASONE PROPIONATE 50 MCG
2 SPRAY, SUSPENSION (ML) NASAL DAILY
Qty: 16 G | Refills: 0 | Status: SHIPPED | OUTPATIENT
Start: 2025-05-26

## 2025-05-26 RX ORDER — BENZONATATE 200 MG/1
200 CAPSULE ORAL 3 TIMES DAILY PRN
Qty: 30 CAPSULE | Refills: 0 | Status: SHIPPED | OUTPATIENT
Start: 2025-05-26 | End: 2025-06-05

## 2025-05-26 RX ORDER — DOXYCYCLINE HYCLATE 100 MG
100 TABLET ORAL EVERY 12 HOURS
Qty: 14 TABLET | Refills: 0 | Status: SHIPPED | OUTPATIENT
Start: 2025-05-26 | End: 2025-06-02

## 2025-05-26 RX ORDER — METHYLPREDNISOLONE 4 MG/1
TABLET ORAL
Qty: 1 EACH | Refills: 0 | Status: SHIPPED | OUTPATIENT
Start: 2025-05-26

## 2025-05-26 NOTE — PATIENT INSTRUCTIONS
flonase nasal spray 2 spays each nostril at night for nasal congestion, post nasal drip, runny nose    Delsym as needed for cough    Saline nasal spray throughout the day for nasal congestion.     Allegra or zyrtec for allergies, post nasal drip, runny nose, watery eyes.    cepacol throat lozenges and/or chloraseptic spray for sore throat    mucinex for chest congestion.     Tylenol or ibuprofen for pain or fever.

## 2025-05-26 NOTE — PROGRESS NOTES
"Subjective:      Patient ID: Karen Carpio is a 65 y.o. female.    Chief Complaint: No chief complaint on file.    Sinusitis  This is a new problem. The current episode started 1 to 4 weeks ago. The problem has been gradually worsening since onset. There has been no fever. Associated symptoms include congestion, coughing and sinus pressure. Pertinent negatives include no chills, diaphoresis, ear pain, hoarse voice, shortness of breath or sore throat. Treatments tried: otc meds. The treatment provided no relief.     History of Present Illness                     Review of Systems   Constitutional:  Negative for chills and diaphoresis.   HENT:  Positive for congestion, postnasal drip and sinus pressure. Negative for ear pain, hoarse voice, nosebleeds and sore throat.    Eyes:  Negative for visual disturbance.   Respiratory:  Positive for cough. Negative for shortness of breath and wheezing.    Cardiovascular:  Negative for chest pain and palpitations.   Gastrointestinal:  Negative for nausea and vomiting.     Objective:   /82   Pulse 82   Temp 98.5 °F (36.9 °C)   Ht 5' 5" (1.651 m)   Wt 90.6 kg (199 lb 11.8 oz)   SpO2 99%   BMI 33.24 kg/m²     Physical Exam  Constitutional:       General: She is awake. She is not in acute distress.     Appearance: Normal appearance. She is well-developed. She is not ill-appearing.   HENT:      Head: Normocephalic and atraumatic.      Right Ear: Tympanic membrane normal.      Left Ear: Tympanic membrane normal.      Nose:      Right Nostril: No epistaxis.      Left Nostril: No epistaxis.      Right Turbinates: Swollen.      Left Turbinates: Swollen.      Right Sinus: Maxillary sinus tenderness present.      Left Sinus: Maxillary sinus tenderness present.      Mouth/Throat:      Pharynx: Posterior oropharyngeal erythema present. No pharyngeal swelling, oropharyngeal exudate or uvula swelling.   Eyes:      Conjunctiva/sclera: Conjunctivae normal.   Cardiovascular:      Rate " and Rhythm: Normal rate.   Pulmonary:      Effort: Pulmonary effort is normal.   Musculoskeletal:      Cervical back: Normal range of motion.   Skin:     General: Skin is warm and dry.   Neurological:      Mental Status: She is alert. Mental status is at baseline.   Psychiatric:         Mood and Affect: Mood normal.         Behavior: Behavior normal. Behavior is cooperative.         Thought Content: Thought content normal.         Judgment: Judgment normal.         Lab Results   Component Value Date    WBC 4.25 12/23/2024    HGB 10.7 (L) 12/23/2024    HGB 11.3 (L) 09/19/2024    HGB 11.1 (L) 06/19/2024    HCT 34.9 (L) 12/23/2024    MCV 96 12/23/2024    MCV 93 09/19/2024    MCV 95 06/19/2024     12/23/2024    CHOL 157 01/08/2025    TRIG 51 01/08/2025    HDL 71 01/08/2025    LDLCALC 75.8 01/08/2025    LDLCALC 85.6 07/05/2024    LDLCALC 90 01/17/2022    ALT 12 01/08/2025    AST 16 01/08/2025     01/08/2025    K 3.8 01/08/2025    CALCIUM 9.3 01/08/2025     01/08/2025    CO2 25 01/08/2025    BUN 21 01/08/2025    CREATININE 0.8 01/08/2025    CREATININE 1.0 12/23/2024    CREATININE 1.1 09/19/2024    EGFRNORACEVR >60.0 01/08/2025    EGFRNORACEVR >60.0 12/23/2024    EGFRNORACEVR 55.4 (A) 09/19/2024    TSH 1.09 04/07/2025    TSH 0.60 09/13/2024    TSH 0.92 03/08/2024    GLU 79 01/08/2025    HGBA1C 4.8 04/07/2025    HGBA1C 5.2 09/13/2024    HGBA1C 5.2 03/08/2024    BCQXOOXN19HC 56.4 04/07/2025    PLKHZEKX39ZQ 52.60 09/13/2024    OQEZIRMW90YS 57.00 03/08/2024          The 10-year ASCVD risk score (Leila KOENIG, et al., 2019) is: 16%    Values used to calculate the score:      Age: 65 years      Sex: Female      Is Non- : No      Diabetic: Yes      Tobacco smoker: No      Systolic Blood Pressure: 136 mmHg      Is BP treated: Yes      HDL Cholesterol: 71 mg/dL      Total Cholesterol: 157 mg/dL     Assessment:     1. Sinobronchitis    2. Cough, unspecified type    3. Malignant neoplasm of  lower-inner quadrant of left breast in female, estrogen receptor negative    4. Mixed hyperlipidemia    5. Iron deficiency anemia secondary to inadequate dietary iron intake    6. Hypothyroidism due to acquired atrophy of thyroid    7. Family history of breast cancer    8. Obesity (BMI 30-39.9)    9. MARGARITA (generalized anxiety disorder)    10. Moderate episode of recurrent major depressive disorder    11. Typical atrial flutter      Plan:   1. Sinobronchitis  -     fluticasone propionate (FLONASE) 50 mcg/actuation nasal spray; 2 sprays (100 mcg total) by Each Nostril route once daily.  Dispense: 16 g; Refill: 0  -     methylPREDNISolone (MEDROL, DARELL,) 4 mg tablet; use as directed  Dispense: 1 each; Refill: 0  -     doxycycline (VIBRA-TABS) 100 MG tablet; Take 1 tablet (100 mg total) by mouth every 12 (twelve) hours. for 7 days  Dispense: 14 tablet; Refill: 0  -     benzonatate (TESSALON) 200 MG capsule; Take 1 capsule (200 mg total) by mouth 3 (three) times daily as needed for Cough.  Dispense: 30 capsule; Refill: 0    2. Cough, unspecified type  -     POCT COVID-19 Rapid Screening  -     POCT Influenza A/B Molecular    3. Malignant neoplasm of lower-inner quadrant of left breast in female, estrogen receptor negative    4. Mixed hyperlipidemia  Overview:  Formatting of this note might be different from the original.   Mixed hyperlipidemia      Last Assessment & Plan:    Formatting of this note might be different from the original.   Lipids are at target.  Continue to monitor.      5. Iron deficiency anemia secondary to inadequate dietary iron intake    6. Hypothyroidism due to acquired atrophy of thyroid    7. Family history of breast cancer    8. Obesity (BMI 30-39.9)  Overview:  Formatting of this note might be different from the original.   METABOLIC SYNDROME      Last Assessment & Plan:    Formatting of this note might be different from the original.   Dr. Villegas exercise discussed.  Last Assessment & Plan:     Formatting of this note might be different from the original.   Dietary modification discussed.  10 pound weight loss goal has been set      9. MARGARITA (generalized anxiety disorder)    10. Moderate episode of recurrent major depressive disorder    11. Typical atrial flutter        Patient Instructions   flonase nasal spray 2 spays each nostril at night for nasal congestion, post nasal drip, runny nose    Delsym as needed for cough    Saline nasal spray throughout the day for nasal congestion.     Allegra or zyrtec for allergies, post nasal drip, runny nose, watery eyes.    cepacol throat lozenges and/or chloraseptic spray for sore throat    mucinex for chest congestion.     Tylenol or ibuprofen for pain or fever.        Future Appointments   Date Time Provider Department Center   6/23/2025 10:00 AM [facility]  MAMMO2 DX [facility] MAMMO [place] [facility]   7/7/2025  8:40 AM LABORATORY, [facility] LAB [facility]   7/14/2025  1:00 PM Naif Navarro MD HG IM [facility]   8/19/2025  3:10 PM Holly Lopez MD [facility] OBGYN [facility]       Lab Frequency Next Occurrence   Hemoglobin A1c (Diabetes) Once 09/12/2024   Lipid Panel Once 01/13/2025   Comprehensive Metabolic Panel Once 01/13/2025   Referral to Enhanced Annual Wellness Visit (eAWV) W+1 Once 02/28/2025       Follow up if symptoms worsen or fail to improve.       Visit today included increased complexity  addressed and managing the longitudinal care of the patient due to the serious and/or complex managed problem(s)

## 2025-05-27 DIAGNOSIS — J32.9 SINOBRONCHITIS: ICD-10-CM

## 2025-05-27 DIAGNOSIS — J40 SINOBRONCHITIS: ICD-10-CM

## 2025-05-27 RX ORDER — FLUTICASONE PROPIONATE 50 MCG
SPRAY, SUSPENSION (ML) NASAL
Qty: 48 G | OUTPATIENT
Start: 2025-05-27

## 2025-05-27 NOTE — TELEPHONE ENCOUNTER
Refill Decision Note   Karenkymberly Carpio  is requesting a refill authorization.  Brief Assessment and Rationale for Refill:  Quick Discontinue     Medication Therapy Plan:  E-Prescribing Status: Receipt confirmed by Sheron#[id number] (5/26/2025      Comments:     Note composed:11:45 AM 05/27/2025

## 2025-05-27 NOTE — TELEPHONE ENCOUNTER
No care due was identified.  [facility] Embedded Care Due Messages. Reference number: [id number].   5/27/2025 9:14:46 AM CDT

## 2025-07-11 ENCOUNTER — LAB VISIT (OUTPATIENT)
Dept: LAB | Facility: HOSPITAL | Age: 65
End: 2025-07-11
Attending: PEDIATRICS
Payer: MEDICARE

## 2025-07-11 DIAGNOSIS — E78.2 MIXED HYPERLIPIDEMIA: ICD-10-CM

## 2025-07-11 DIAGNOSIS — E11.9 TYPE 2 DIABETES MELLITUS WITHOUT COMPLICATION: ICD-10-CM

## 2025-07-11 LAB
ALBUMIN SERPL BCP-MCNC: 3.7 G/DL (ref 3.5–5.2)
ALP SERPL-CCNC: 77 UNIT/L (ref 40–150)
ALT SERPL W/O P-5'-P-CCNC: 15 UNIT/L (ref 10–44)
ANION GAP (OHS): 6 MMOL/L (ref 8–16)
AST SERPL-CCNC: 17 UNIT/L (ref 11–45)
BILIRUB SERPL-MCNC: 0.4 MG/DL (ref 0.1–1)
BUN SERPL-MCNC: 22 MG/DL (ref 8–23)
CALCIUM SERPL-MCNC: 9.1 MG/DL (ref 8.7–10.5)
CHLORIDE SERPL-SCNC: 110 MMOL/L (ref 95–110)
CHOLEST SERPL-MCNC: 161 MG/DL (ref 120–199)
CHOLEST/HDLC SERPL: 2.5 {RATIO} (ref 2–5)
CO2 SERPL-SCNC: 27 MMOL/L (ref 23–29)
CREAT SERPL-MCNC: 0.9 MG/DL (ref 0.5–1.4)
EAG (OHS): 94 MG/DL (ref 68–131)
GFR SERPLBLD CREATININE-BSD FMLA CKD-EPI: >60 ML/MIN/1.73/M2
GLUCOSE SERPL-MCNC: 82 MG/DL (ref 70–110)
HBA1C MFR BLD: 4.9 % (ref 4–5.6)
HDLC SERPL-MCNC: 65 MG/DL (ref 40–75)
HDLC SERPL: 40.4 % (ref 20–50)
LDLC SERPL CALC-MCNC: 87 MG/DL (ref 63–159)
NONHDLC SERPL-MCNC: 96 MG/DL
POTASSIUM SERPL-SCNC: 4.1 MMOL/L (ref 3.5–5.1)
PROT SERPL-MCNC: 6.4 GM/DL (ref 6–8.4)
SODIUM SERPL-SCNC: 143 MMOL/L (ref 136–145)
TRIGL SERPL-MCNC: 45 MG/DL (ref 30–150)

## 2025-07-11 PROCEDURE — 83036 HEMOGLOBIN GLYCOSYLATED A1C: CPT

## 2025-07-11 PROCEDURE — 36415 COLL VENOUS BLD VENIPUNCTURE: CPT

## 2025-07-11 PROCEDURE — 84155 ASSAY OF PROTEIN SERUM: CPT

## 2025-07-11 PROCEDURE — 80061 LIPID PANEL: CPT

## 2025-07-14 ENCOUNTER — OFFICE VISIT (OUTPATIENT)
Dept: INTERNAL MEDICINE | Facility: CLINIC | Age: 65
End: 2025-07-14
Payer: MEDICARE

## 2025-07-14 VITALS
DIASTOLIC BLOOD PRESSURE: 74 MMHG | WEIGHT: 196.44 LBS | TEMPERATURE: 99 F | SYSTOLIC BLOOD PRESSURE: 110 MMHG | HEART RATE: 75 BPM | BODY MASS INDEX: 32.73 KG/M2 | HEIGHT: 65 IN | OXYGEN SATURATION: 98 %

## 2025-07-14 DIAGNOSIS — Z17.1 MALIGNANT NEOPLASM OF LOWER-INNER QUADRANT OF LEFT BREAST IN FEMALE, ESTROGEN RECEPTOR NEGATIVE: Chronic | ICD-10-CM

## 2025-07-14 DIAGNOSIS — K21.9 GASTROESOPHAGEAL REFLUX DISEASE WITHOUT ESOPHAGITIS: ICD-10-CM

## 2025-07-14 DIAGNOSIS — Z86.39 HISTORY OF DIABETES MELLITUS, TYPE II: ICD-10-CM

## 2025-07-14 DIAGNOSIS — C50.312 MALIGNANT NEOPLASM OF LOWER-INNER QUADRANT OF LEFT BREAST IN FEMALE, ESTROGEN RECEPTOR NEGATIVE: Chronic | ICD-10-CM

## 2025-07-14 DIAGNOSIS — E66.9 OBESITY (BMI 30-39.9): Primary | ICD-10-CM

## 2025-07-14 DIAGNOSIS — G47.33 OSA (OBSTRUCTIVE SLEEP APNEA): ICD-10-CM

## 2025-07-14 DIAGNOSIS — E55.9 VITAMIN D DEFICIENCY: ICD-10-CM

## 2025-07-14 DIAGNOSIS — Z98.84 S/P BARIATRIC SURGERY: ICD-10-CM

## 2025-07-14 DIAGNOSIS — E03.4 HYPOTHYROIDISM DUE TO ACQUIRED ATROPHY OF THYROID: ICD-10-CM

## 2025-07-14 DIAGNOSIS — D50.8 IRON DEFICIENCY ANEMIA SECONDARY TO INADEQUATE DIETARY IRON INTAKE: ICD-10-CM

## 2025-07-14 DIAGNOSIS — E78.2 MIXED HYPERLIPIDEMIA: ICD-10-CM

## 2025-07-14 DIAGNOSIS — F33.1 MODERATE EPISODE OF RECURRENT MAJOR DEPRESSIVE DISORDER: ICD-10-CM

## 2025-07-14 DIAGNOSIS — E88.819 INSULIN RESISTANCE: ICD-10-CM

## 2025-07-14 DIAGNOSIS — I48.3 TYPICAL ATRIAL FLUTTER: ICD-10-CM

## 2025-07-14 DIAGNOSIS — I10 BENIGN ESSENTIAL HYPERTENSION: ICD-10-CM

## 2025-07-14 DIAGNOSIS — F41.1 GAD (GENERALIZED ANXIETY DISORDER): ICD-10-CM

## 2025-07-14 PROCEDURE — 99214 OFFICE O/P EST MOD 30 MIN: CPT | Mod: PBBFAC | Performed by: PEDIATRICS

## 2025-07-14 PROCEDURE — 99999 PR PBB SHADOW E&M-EST. PATIENT-LVL IV: CPT | Mod: PBBFAC,,, | Performed by: PEDIATRICS

## 2025-07-14 RX ORDER — BUPROPION HYDROCHLORIDE 150 MG/1
150 TABLET ORAL DAILY
COMMUNITY
Start: 2025-05-08 | End: 2025-08-06

## 2025-07-14 NOTE — PROGRESS NOTES
Patient ID: Karen Carpio is a 65 y.o. female.    Chief Complaint: Follow-up    History of Present Illness    CHIEF COMPLAINT:  Patient presents today for regular follow-up    MEDICAL HISTORY:  She has hypothyroidism, currently stable with good thyroid test results. She has insulin resistant metabolic syndromeboth followed by Dr Drake. S/p  bariatric surgery, On Mounjaro with A1C of 4.9. She has a history of breast cancer, recently followed by Dr. Tate with satisfactory outcomes. She has atrial fibrillation, last seen by Dr. Baxter within the past six months. She is diagnosed with sleep apnea and currently uses CPAP for management. Anxiety and depression, managed outside psychiatry    WEIGHT MANAGEMENT:  She currently weighs 196 lbs, having lost approximately 42 lbs from her highest recorded weight of 238 lbs. This weight loss has been achieved gradually through medication management with Mounjaro.    CURRENT MEDICATIONS:  She is currently taking Mounjaro 2.5 mg, pantoprazole, metoprolol 25 mg, levothyroxine 200 mcg, iron tablets for iron deficiency anemia, Lexapro 20 mg (which she reports as ineffective), Wellbutrin 450 mg total (300 mg and 150 mg), Lipitor 20 mg, Abilify 5 mg, and Xarelto.    LABS / TEST RESULTS:  Hemoglobin is mildly low at 10.9, which has remained relatively stable compared to prior levels. Thyroid tests are within normal limits. A1C is 4.9, indicating good glycemic control. Cholesterol tests are within normal range.    PMH, PSH, SH, FH reviewed with patient.      ROS:  General: -fever, -chills, -fatigue, -weight gain, -weight loss  Eyes: -vision changes, -redness, -discharge  ENT: -ear pain, -nasal congestion, -sore throat  Cardiovascular: -chest pain, -palpitations, -lower extremity edema  Respiratory: -cough, -shortness of breath, +apnea, +intermittent breathing while sleeping  Gastrointestinal: -abdominal pain, -nausea, -vomiting, -diarrhea, -constipation, -blood in stool  Genitourinary:  -dysuria, -hematuria, -frequency  Musculoskeletal: -joint pain, -muscle pain  Skin: -rash, -lesion  Neurological: -headache, -dizziness, -numbness, -tingling  Psychiatric: -anxiety, -depression, -sleep difficulty         Exam:  Physical Exam    Vitals: Weight: 196 lbs.  General: No acute distress. Well-developed. Well-nourished.  Eyes: EOMI. Sclerae anicteric.  HENT: Normocephalic. Atraumatic. Nares patent. Moist oral mucosa.  Cardiovascular: Regular rate. Regular rhythm. No murmurs. No rubs. No gallops. Normal S1, S2.  Respiratory: Normal respiratory effort. Clear to auscultation bilaterally. No rales. No rhonchi. No wheezing.  Abdomen: Soft. Non-tender. Non-distended. Normoactive bowel sounds.  Musculoskeletal: No  obvious deformity.  Extremities: No lower extremity edema.  Neurological: Alert & oriented x3. No slurred speech. Normal gait.  Psychiatric: Normal mood. Normal affect. Good insight. Good judgment.  Skin: Warm. Dry. No rash.         Assessment/Plan:  Obesity (BMI 30-39.9)    Moderate episode of recurrent major depressive disorder    Mixed hyperlipidemia    S/P bariatric surgery    Vitamin D deficiency  -     Vitamin D; Future; Expected date: 07/14/2025    Typical atrial flutter    Malignant neoplasm of lower-inner quadrant of left breast in female, estrogen receptor negative    Iron deficiency anemia secondary to inadequate dietary iron intake  -     CBC Auto Differential; Future; Expected date: 07/14/2025    Hypothyroidism due to acquired atrophy of thyroid    Gastroesophageal reflux disease without esophagitis    MARGARITA (generalized anxiety disorder)    Insulin resistance  -     Comprehensive Metabolic Panel; Future; Expected date: 07/14/2025  -     Lipid Panel; Future; Expected date: 07/14/2025  -     Hemoglobin A1C; Future; Expected date: 07/14/2025  -     Microalbumin/Creatinine Ratio, Urine; Future; Expected date: 07/14/2025    Benign essential hypertension    History of diabetes mellitus, type II  -      Hemoglobin A1C; Future; Expected date: 07/14/2025    TAL (obstructive sleep apnea)         Assessment & Plan    IMPRESSION:  - Reviewed thyroid and metabolic status post-bariatric surgery; A1C and thyroid tests WNL.  - Assessed recent breast cancer follow-up with Dr. Tate; no concerns noted.  - A-fib management stable on current treatment.  - Analyzed CBC results; mild anemia noted but consistent with long-term trend and not clinically significant.  - Considered sleep apnea as potential contributor to symptoms; recommended CPAP adjustment evaluation.  - Overall health status shows good management of chronic conditions and continued weight loss.    HYPOTHYROIDISM:  - Thyroid test results are looking good.  - Continue levothyroxine 200 mcg.    METABOLIC SYNDROME:  - A1C was 4.9, indicating good control of metabolic syndrome.  - Continue Mounjaro 2.5 mg for insulin resistance.    ATRIAL FIBRILLATION:  - No new changes in management since last visit.  - Continue Metoprolol 25 mg and Xarelto.    IRON DEFICIENCY ANEMIA:  - Hemoglobin level is 10.9, slightly below normal range for females.  - Explained that current level of anemia is unlikely to cause noticeable symptoms.  - Continue iron tablets for anemia related to iron deficiency and chemotherapy.    OBSTRUCTIVE SLEEP APNEA:  - Patient uses CPAP for sleep apnea.  - Possible deconditioning and sleep apnea are contributing to symptoms.  - Recommend obtaining CPAP data download to bring to sleep specialist for potential adjustments.    MAJOR DEPRESSIVE DISORDER:  - Continue Lexapro 20 mg, bupropion 450 mg total daily dose (300 mg + 150 mg), and Abilify 5 mg.  - Will contact pharmacy to inquire about insurance coverage for bupropion as 3 150 mg tablets daily instead of current regimen to potentially reduce copay.    HYPERLIPIDEMIA:  - Cholesterol levels are great.  - Continue atorvastatin 20 mg.    WEIGHT MANAGEMENT:  - Patient's weight continues to decrease  gradually, currently at 196 lbs, down from 238 lbs.    PREVENTIVE CARE:  - Patient to obtain tetanus (Tdap) and RSV vaccinations at pharmacy, and fall flu and COVID vaccinations when due.  - Continue Pantoprazole.  - Discussed importance of continued preventive care and regular check-ups.    BREAST CANCER FOLLOW-UP:  - Patient saw Dr. Tate for breast cancer follow-up with satisfactory results.          Visit today included increased complexity associated with the care of the episodic problem  addressed and managing the longitudinal care of the patient due to the serious and/or complex managed problem(s) .      Follow up in about 6 months (around 1/14/2026).    This note was generated with the assistance of ambient listening technology. Verbal consent was obtained by the patient and accompanying visitor(s) for the recording of patient appointment to facilitate this note. I attest to having reviewed and edited the generated note for accuracy, though some syntax or spelling errors may persist. Please contact the author of this note for any clarification.

## 2025-07-15 RX ORDER — FERROUS SULFATE 325(65) MG
TABLET ORAL
Qty: 30 TABLET | Refills: 11 | Status: SHIPPED | OUTPATIENT
Start: 2025-07-15